# Patient Record
Sex: MALE | Race: BLACK OR AFRICAN AMERICAN | NOT HISPANIC OR LATINO | Employment: STUDENT | URBAN - METROPOLITAN AREA
[De-identification: names, ages, dates, MRNs, and addresses within clinical notes are randomized per-mention and may not be internally consistent; named-entity substitution may affect disease eponyms.]

---
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
## 2024-01-16 NOTE — ADDENDUM NOTE
Encounter addended by: Julio Villeda MD on: 1/16/2024 2:46 PM   Actions taken: Level of Service modified

## 2024-01-28 RX ORDER — ALBUTEROL SULFATE 0.83 MG/ML
5 SOLUTION RESPIRATORY (INHALATION) EVERY 6 HOURS PRN
OUTPATIENT
Start: 2024-01-28

## 2024-01-28 RX ORDER — PENTAMIDINE ISETHIONATE 300 MG/300MG
300 INHALANT RESPIRATORY (INHALATION) ONCE
OUTPATIENT
Start: 2024-01-29

## 2024-01-29 ENCOUNTER — HOSPITAL ENCOUNTER (OUTPATIENT)
Dept: PEDIATRIC HEMATOLOGY/ONCOLOGY | Facility: HOSPITAL | Age: 19
Discharge: HOME | End: 2024-01-29
Payer: COMMERCIAL

## 2024-01-29 VITALS
RESPIRATION RATE: 20 BRPM | WEIGHT: 178.79 LBS | DIASTOLIC BLOOD PRESSURE: 73 MMHG | HEART RATE: 76 BPM | SYSTOLIC BLOOD PRESSURE: 117 MMHG | TEMPERATURE: 97.8 F | BODY MASS INDEX: 25.03 KG/M2 | HEIGHT: 71 IN

## 2024-01-29 DIAGNOSIS — D61.9 APLASTIC ANEMIA (MULTI): Primary | ICD-10-CM

## 2024-01-29 LAB
ALBUMIN SERPL BCP-MCNC: 4.8 G/DL (ref 3.4–5)
ALP SERPL-CCNC: 55 U/L (ref 33–120)
ALT SERPL W P-5'-P-CCNC: 12 U/L (ref 10–52)
ANION GAP SERPL CALC-SCNC: 14 MMOL/L (ref 10–20)
AST SERPL W P-5'-P-CCNC: 19 U/L (ref 9–39)
BASOPHILS # BLD AUTO: 0.02 X10*3/UL (ref 0–0.1)
BASOPHILS NFR BLD AUTO: 0.3 %
BILIRUB DIRECT SERPL-MCNC: 0.2 MG/DL (ref 0–0.3)
BILIRUB SERPL-MCNC: 1.3 MG/DL (ref 0–1.2)
BUN SERPL-MCNC: 18 MG/DL (ref 6–23)
CALCIUM SERPL-MCNC: 10.1 MG/DL (ref 8.6–10.6)
CHLORIDE SERPL-SCNC: 103 MMOL/L (ref 98–107)
CO2 SERPL-SCNC: 26 MMOL/L (ref 21–32)
CREAT SERPL-MCNC: 1.07 MG/DL (ref 0.5–1.3)
CYCLOSPORINE BLD IA-MCNC: 125 NG/ML (ref 80–210)
EGFRCR SERPLBLD CKD-EPI 2021: >90 ML/MIN/1.73M*2
EOSINOPHIL # BLD AUTO: 0.13 X10*3/UL (ref 0–0.7)
EOSINOPHIL NFR BLD AUTO: 2.1 %
ERYTHROCYTE [DISTWIDTH] IN BLOOD BY AUTOMATED COUNT: 12 % (ref 11.5–14.5)
GLUCOSE SERPL-MCNC: 88 MG/DL (ref 74–99)
HCT VFR BLD AUTO: 37.4 % (ref 41–52)
HGB BLD-MCNC: 12.9 G/DL (ref 13.5–17.5)
HGB RETIC QN: 33 PG (ref 28–38)
IMM GRANULOCYTES # BLD AUTO: 0.02 X10*3/UL (ref 0–0.7)
IMM GRANULOCYTES NFR BLD AUTO: 0.3 % (ref 0–0.9)
IMMATURE RETIC FRACTION: 15.5 %
LYMPHOCYTES # BLD AUTO: 2.64 X10*3/UL (ref 1.2–4.8)
LYMPHOCYTES NFR BLD AUTO: 43 %
MCH RBC QN AUTO: 28.7 PG (ref 26–34)
MCHC RBC AUTO-ENTMCNC: 34.5 G/DL (ref 32–36)
MCV RBC AUTO: 83 FL (ref 80–100)
MONOCYTES # BLD AUTO: 0.62 X10*3/UL (ref 0.1–1)
MONOCYTES NFR BLD AUTO: 10.1 %
NEUTROPHILS # BLD AUTO: 2.71 X10*3/UL (ref 1.2–7.7)
NEUTROPHILS NFR BLD AUTO: 44.2 %
NRBC BLD-RTO: 0 /100 WBCS (ref 0–0)
PHOSPHATE SERPL-MCNC: 5.1 MG/DL (ref 2.5–4.9)
PLATELET # BLD AUTO: 247 X10*3/UL (ref 150–450)
POTASSIUM SERPL-SCNC: 4 MMOL/L (ref 3.5–5.3)
PROT SERPL-MCNC: 7.7 G/DL (ref 6.4–8.2)
RBC # BLD AUTO: 4.5 X10*6/UL (ref 4.5–5.9)
RETICS #: 0.1 X10*6/UL (ref 0.02–0.12)
RETICS/RBC NFR AUTO: 2.1 % (ref 0.5–2)
SODIUM SERPL-SCNC: 139 MMOL/L (ref 136–145)
WBC # BLD AUTO: 6.1 X10*3/UL (ref 4.4–11.3)

## 2024-01-29 PROCEDURE — 99215 OFFICE O/P EST HI 40 MIN: CPT | Performed by: STUDENT IN AN ORGANIZED HEALTH CARE EDUCATION/TRAINING PROGRAM

## 2024-01-29 PROCEDURE — 84100 ASSAY OF PHOSPHORUS: CPT | Performed by: STUDENT IN AN ORGANIZED HEALTH CARE EDUCATION/TRAINING PROGRAM

## 2024-01-29 PROCEDURE — 2500000004 HC RX 250 GENERAL PHARMACY W/ HCPCS (ALT 636 FOR OP/ED): Performed by: STUDENT IN AN ORGANIZED HEALTH CARE EDUCATION/TRAINING PROGRAM

## 2024-01-29 PROCEDURE — 94644 CONT INHLJ TX 1ST HOUR: CPT

## 2024-01-29 PROCEDURE — 84075 ASSAY ALKALINE PHOSPHATASE: CPT | Performed by: STUDENT IN AN ORGANIZED HEALTH CARE EDUCATION/TRAINING PROGRAM

## 2024-01-29 PROCEDURE — 80069 RENAL FUNCTION PANEL: CPT | Performed by: STUDENT IN AN ORGANIZED HEALTH CARE EDUCATION/TRAINING PROGRAM

## 2024-01-29 PROCEDURE — 85045 AUTOMATED RETICULOCYTE COUNT: CPT | Performed by: STUDENT IN AN ORGANIZED HEALTH CARE EDUCATION/TRAINING PROGRAM

## 2024-01-29 PROCEDURE — 80158 DRUG ASSAY CYCLOSPORINE: CPT | Performed by: STUDENT IN AN ORGANIZED HEALTH CARE EDUCATION/TRAINING PROGRAM

## 2024-01-29 PROCEDURE — 36415 COLL VENOUS BLD VENIPUNCTURE: CPT | Performed by: STUDENT IN AN ORGANIZED HEALTH CARE EDUCATION/TRAINING PROGRAM

## 2024-01-29 PROCEDURE — 85025 COMPLETE CBC W/AUTO DIFF WBC: CPT | Performed by: STUDENT IN AN ORGANIZED HEALTH CARE EDUCATION/TRAINING PROGRAM

## 2024-01-29 PROCEDURE — 94645 CONT INHLJ TX EACH ADDL HOUR: CPT

## 2024-01-29 PROCEDURE — 2500000002 HC RX 250 W HCPCS SELF ADMINISTERED DRUGS (ALT 637 FOR MEDICARE OP, ALT 636 FOR OP/ED)

## 2024-01-29 RX ORDER — PENTAMIDINE ISETHIONATE 300 MG/300MG
300 INHALANT RESPIRATORY (INHALATION) ONCE
Status: COMPLETED | OUTPATIENT
Start: 2024-01-29 | End: 2024-01-29

## 2024-01-29 RX ORDER — ALBUTEROL SULFATE 0.83 MG/ML
SOLUTION RESPIRATORY (INHALATION)
Status: COMPLETED
Start: 2024-01-29 | End: 2024-01-29

## 2024-01-29 RX ORDER — ALBUTEROL SULFATE 5 MG/ML
5 SOLUTION RESPIRATORY (INHALATION) ONCE
Status: DISPENSED | OUTPATIENT
Start: 2024-01-29

## 2024-01-29 RX ADMIN — PENTAMIDINE ISETHIONATE 300 MG: 300 INHALANT RESPIRATORY (INHALATION) at 11:15

## 2024-01-29 RX ADMIN — ALBUTEROL SULFATE 5 MG: 2.5 SOLUTION RESPIRATORY (INHALATION) at 11:00

## 2024-01-29 ASSESSMENT — ENCOUNTER SYMPTOMS
EYES NEGATIVE: 1
ACTIVITY CHANGE: 0
NUMBNESS: 0
ABDOMINAL PAIN: 0
DIZZINESS: 0
MUSCULOSKELETAL NEGATIVE: 1
UNEXPECTED WEIGHT CHANGE: 0
NAUSEA: 0
VOMITING: 0
SLEEP DISTURBANCE: 0
APPETITE CHANGE: 0
ABDOMINAL DISTENTION: 0
FEVER: 0
CHEST TIGHTNESS: 0
CONSTIPATION: 0
EYE PAIN: 0
OCCASIONAL FEELINGS OF UNSTEADINESS: 0
SHORTNESS OF BREATH: 0
CONSTITUTIONAL NEGATIVE: 1
BLOOD IN STOOL: 0
HEADACHES: 0
ENDOCRINE NEGATIVE: 1
HEMATOLOGIC/LYMPHATIC NEGATIVE: 1
NEUROLOGICAL NEGATIVE: 1
RHINORRHEA: 0
PSYCHIATRIC NEGATIVE: 1
COLOR CHANGE: 0
LOSS OF SENSATION IN FEET: 0
PALPITATIONS: 0
DIARRHEA: 0
CARDIOVASCULAR NEGATIVE: 1
FATIGUE: 0
RECTAL PAIN: 0
ADENOPATHY: 0
RESPIRATORY NEGATIVE: 1

## 2024-01-29 ASSESSMENT — PAIN SCALES - GENERAL: PAINLEVEL: 0-NO PAIN

## 2024-01-29 NOTE — PROGRESS NOTES
Patient ID: Joaquin Talbot is a 18 y.o. male.  Referring Physician: Judith Hill MD  [address]  [facility]-Hematology/Oncology/House Staff  [place] [phone]  Primary Care Provider: Virgilio Torres DO    Date of Service:  1/29/2024    SUBJECTIVE:    History of Present Illness:  Joaquin is a 18 year old male with severe aplastic anemia on immunosuppressive therapy here for follow up. Joaquin is accompanied by his mother today.      Joaquin was last seen in [facility] on 12/18/23. He has overall been well since his last clinic visit. He is currently taking Cyclosporine 300mg daily (200mg AM, 100mg PM). Joaquin reports that he has missed 1-2 doses over the last week. No recent febrile illness. Joaquin reports good energy levels throughout the day. Denies any chest pain, palpitations, shortness of breath at rest or on exertion, dizziness, syncopal episodes or headaches. Denies any easy bruising or bleeding including hematuria, hematochezia, melena, gum bleeding or recent epistaxis. Denies any oral sores, throat pain or rectal pain. Joaquin has no new concerns today.        Past Medical History: Joaquin has a past medical history of Body mass index (BMI) pediatric, 5th percentile to less than 85th percentile for age (04/30/2022), Encounter for examination for participation in sport (08/02/2021), Encounter for routine child health examination without abnormal findings (08/31/2020), Essential (primary) hypertension, Hemorrhage due to vascular prosthetic devices, implants and grafts, initial encounter (CMS/[facility]) (03/08/2022), Other conditions influencing health status (12/22/2021), Other disorders affecting eyelid function (02/03/2017), Personal history of other diseases of the respiratory system (12/23/2021), Personal history of other diseases of the respiratory system (12/22/2021), Personal history of other drug therapy (01/28/2022), Personal history of other specified conditions (12/22/2021),  "Strain of adductor muscle, fascia and tendon of unspecified thigh, initial encounter (09/15/2021), and Unspecified injury of abdomen, initial encounter (02/03/2017).    Surgical History:  Joaquin has a past surgical history that includes Other surgical history (08/04/2022) and Other surgical history (08/04/2022).    Social History:  Joaquin     Family History   Problem Relation Name Age of Onset    Diabetes Father         Review of Systems   Constitutional: Negative.  Negative for activity change, appetite change, fatigue, fever and unexpected weight change.   HENT: Negative.  Negative for congestion and rhinorrhea.    Eyes: Negative.  Negative for pain.   Respiratory: Negative.  Negative for chest tightness and shortness of breath.    Cardiovascular: Negative.  Negative for chest pain, palpitations and leg swelling.   Gastrointestinal:  Negative for abdominal distention, abdominal pain, blood in stool, constipation, diarrhea, nausea, rectal pain and vomiting.   Endocrine: Negative.    Genitourinary: Negative.    Musculoskeletal: Negative.    Skin: Negative.  Negative for color change and rash.   Allergic/Immunologic: Positive for immunocompromised state.   Neurological: Negative.  Negative for dizziness, numbness and headaches.   Hematological: Negative.  Negative for adenopathy.   Psychiatric/Behavioral: Negative.  Negative for behavioral problems and sleep disturbance.    All other systems reviewed and are negative.        VS:  /73 (BP Location: Right arm, Patient Position: Sitting, BP Cuff Size: Adult)   Pulse 76   Temp 36.6 °C (97.8 °F) (Oral)   Resp 20   Ht 1.811 m (5' 11.3\")   Wt 81.1 kg (178 lb 12.7 oz)   BMI 24.73 kg/m²   BSA: 2.02 meters squared    Physical Exam  Vitals and nursing note reviewed.   Constitutional:       General: He is not in acute distress.     Appearance: Normal appearance. He is normal weight. He is not ill-appearing or toxic-appearing.   HENT:      Head: Normocephalic " and atraumatic.      Nose: Nose normal.      Mouth/Throat:      Mouth: Mucous membranes are moist.      Pharynx: No oropharyngeal exudate or posterior oropharyngeal erythema.   Eyes:      Extraocular Movements: Extraocular movements intact.      Conjunctiva/sclera: Conjunctivae normal.      Pupils: Pupils are equal, round, and reactive to light.   Cardiovascular:      Rate and Rhythm: Normal rate and regular rhythm.      Pulses: Normal pulses.      Heart sounds: Normal heart sounds. No murmur heard.  Pulmonary:      Effort: Pulmonary effort is normal.      Breath sounds: Normal breath sounds.   Abdominal:      General: Bowel sounds are normal. There is no distension.      Palpations: Abdomen is soft. There is no mass.      Tenderness: There is no abdominal tenderness. There is no guarding.   Musculoskeletal:         General: Normal range of motion.      Cervical back: Normal range of motion.   Skin:     General: Skin is warm.      Capillary Refill: Capillary refill takes less than 2 seconds.      Coloration: Skin is not pale.      Findings: No bruising or rash.   Neurological:      General: No focal deficit present.      Mental Status: He is alert and oriented to person, place, and time. Mental status is at baseline.   Psychiatric:         Mood and Affect: Mood normal.         Laboratory:   Latest Reference Range & Units 12/18/23 09:09   WBC 4.4 - 11.3 x10*3/uL 7.7   nRBC 0.0 - 0.0 /100 WBCs 0.0   RBC 4.50 - 5.90 x10*6/uL 3.93 (L)   HEMOGLOBIN 13.5 - 17.5 g/dL 11.2 (L)   HEMATOCRIT 41.0 - 52.0 % 34.2 (L)   MCV 80 - 100 fL 87   MCH 26.0 - 34.0 pg 28.5   MCHC 32.0 - 36.0 g/dL 32.7   RED CELL DISTRIBUTION WIDTH 11.5 - 14.5 % 11.7   Platelets 150 - 450 x10*3/uL 207      Latest Reference Range & Units 12/18/23 09:09   Neutrophils Absolute 1.20 - 7.70 x10*3/uL 3.98      Latest Reference Range & Units 12/18/23 09:09   Creatinine 0.50 - 1.30 mg/dL 1.11      Latest Reference Range & Units 12/18/23 09:09   Cyclosporine 80 -  210 ng/mL 218 (H)       ASSESSMENT and PLAN:    Assessment:  Joaquin is a 18 year old male with severe aplastic anemia here for follow up. He is s/p ATG (2/23-2/26/2022) and is currently on CSA and Eltrombopag. His course was complicated by steroid induced hyperglycemia/ hypertension (resolved), CSA induced gingival hyperplasia (responded to Azithomycin and gum reduction procedure by periodontist) which is also resolved now. He has responded to immunosuppressive therapy with count recovery which has since been stable. His last PRBC transfusion was on 4/1/22 and platelets were on 4/25/22 (given prior to dental procedure). He is currently on cyclosporine 300mg daily (200mg AM and 100mg PM). CBC today is stable with Hb 12.9, WBC 6.1 with normal ANC 2710, and normal platelet count 247K. Cyclosporine level is sub optimal 125 (goal of 200-400), however may be reflective of decreased adherence.      Plan:  Aplastic Anemia:  - Continue Cyclosporine 300 mg daily (200 mg AM and 100 mg PM) and will recheck Cyclosporine level at his next visit in 4 weeks. Advised to hold that morning's dose before level is drawn.   - Will begin a slow cyclosporine wean at his next visit  - Continue Promacta 150mg daily  - PJP prophylaxis: Received Pentamidine today. Next dose due on 2/26/24  - Fungal prophylaxis: Continue Fluconazole 400mg daily     Plan discussed with patient who voiced understanding and all questions were answered  Patient was seen and discussed with Pediatric Hematologist/Oncologist, Dr. Kamran Hill MD

## 2024-02-02 NOTE — ADDENDUM NOTE
Encounter addended by: Judith Hill MD on: 2/2/2024 12:58 PM   Actions taken: Order list changed, Diagnosis association updated, Clinical Note Signed

## 2024-02-21 NOTE — ADDENDUM NOTE
Encounter addended by: Julio Villeda MD on: 2/21/2024 2:59 PM   Actions taken: Level of Service modified

## 2024-02-23 DIAGNOSIS — D61.9 APLASTIC ANEMIA (MULTI): Primary | ICD-10-CM

## 2024-02-23 RX ORDER — CYCLOSPORINE 100 MG/1
300 CAPSULE, GELATIN COATED ORAL DAILY
Qty: 90 CAPSULE | Refills: 2 | Status: SHIPPED | OUTPATIENT
Start: 2024-02-23 | End: 2024-05-23

## 2024-02-26 ENCOUNTER — HOSPITAL ENCOUNTER (OUTPATIENT)
Dept: PEDIATRIC HEMATOLOGY/ONCOLOGY | Facility: HOSPITAL | Age: 19
Discharge: HOME | End: 2024-02-26
Payer: COMMERCIAL

## 2024-02-26 VITALS
BODY MASS INDEX: 26.08 KG/M2 | SYSTOLIC BLOOD PRESSURE: 116 MMHG | RESPIRATION RATE: 18 BRPM | WEIGHT: 186.29 LBS | HEIGHT: 71 IN | DIASTOLIC BLOOD PRESSURE: 73 MMHG

## 2024-02-26 DIAGNOSIS — D61.9 APLASTIC ANEMIA (MULTI): Primary | ICD-10-CM

## 2024-02-26 LAB
ALBUMIN SERPL BCP-MCNC: 4.5 G/DL (ref 3.4–5)
ALP SERPL-CCNC: 60 U/L (ref 33–120)
ALT SERPL W P-5'-P-CCNC: 16 U/L (ref 10–52)
ANION GAP SERPL CALC-SCNC: 11 MMOL/L (ref 10–20)
AST SERPL W P-5'-P-CCNC: 19 U/L (ref 9–39)
BASOPHILS # BLD AUTO: 0.01 X10*3/UL (ref 0–0.1)
BASOPHILS NFR BLD AUTO: 0.2 %
BILIRUB DIRECT SERPL-MCNC: 0.2 MG/DL (ref 0–0.3)
BILIRUB SERPL-MCNC: 1.6 MG/DL (ref 0–1.2)
BUN SERPL-MCNC: 15 MG/DL (ref 6–23)
CALCIUM SERPL-MCNC: 10 MG/DL (ref 8.6–10.6)
CHLORIDE SERPL-SCNC: 105 MMOL/L (ref 98–107)
CO2 SERPL-SCNC: 27 MMOL/L (ref 21–32)
CREAT SERPL-MCNC: 0.95 MG/DL (ref 0.5–1.3)
CYCLOSPORINE BLD IA-MCNC: 136 NG/ML (ref 80–210)
EGFRCR SERPLBLD CKD-EPI 2021: >90 ML/MIN/1.73M*2
EOSINOPHIL # BLD AUTO: 0.08 X10*3/UL (ref 0–0.7)
EOSINOPHIL NFR BLD AUTO: 1.7 %
ERYTHROCYTE [DISTWIDTH] IN BLOOD BY AUTOMATED COUNT: 12.2 % (ref 11.5–14.5)
GLUCOSE SERPL-MCNC: 94 MG/DL (ref 74–99)
HCT VFR BLD AUTO: 38.5 % (ref 41–52)
HGB BLD-MCNC: 12.6 G/DL (ref 13.5–17.5)
HGB RETIC QN: 32 PG (ref 28–38)
IMM GRANULOCYTES # BLD AUTO: 0.01 X10*3/UL (ref 0–0.7)
IMM GRANULOCYTES NFR BLD AUTO: 0.2 % (ref 0–0.9)
IMMATURE RETIC FRACTION: 17.5 %
LYMPHOCYTES # BLD AUTO: 1.66 X10*3/UL (ref 1.2–4.8)
LYMPHOCYTES NFR BLD AUTO: 36.2 %
MCH RBC QN AUTO: 28 PG (ref 26–34)
MCHC RBC AUTO-ENTMCNC: 32.7 G/DL (ref 32–36)
MCV RBC AUTO: 86 FL (ref 80–100)
MONOCYTES # BLD AUTO: 0.41 X10*3/UL (ref 0.1–1)
MONOCYTES NFR BLD AUTO: 9 %
NEUTROPHILS # BLD AUTO: 2.41 X10*3/UL (ref 1.2–7.7)
NEUTROPHILS NFR BLD AUTO: 52.7 %
NRBC BLD-RTO: 0 /100 WBCS (ref 0–0)
PHOSPHATE SERPL-MCNC: 3.4 MG/DL (ref 2.5–4.9)
PLATELET # BLD AUTO: 197 X10*3/UL (ref 150–450)
POTASSIUM SERPL-SCNC: 4.4 MMOL/L (ref 3.5–5.3)
PROT SERPL-MCNC: 7.4 G/DL (ref 6.4–8.2)
RBC # BLD AUTO: 4.5 X10*6/UL (ref 4.5–5.9)
RETICS #: 0.09 X10*6/UL (ref 0.02–0.12)
RETICS/RBC NFR AUTO: 2 % (ref 0.5–2)
SODIUM SERPL-SCNC: 139 MMOL/L (ref 136–145)
WBC # BLD AUTO: 4.6 X10*3/UL (ref 4.4–11.3)

## 2024-02-26 PROCEDURE — 85045 AUTOMATED RETICULOCYTE COUNT: CPT | Performed by: STUDENT IN AN ORGANIZED HEALTH CARE EDUCATION/TRAINING PROGRAM

## 2024-02-26 PROCEDURE — 82248 BILIRUBIN DIRECT: CPT | Performed by: STUDENT IN AN ORGANIZED HEALTH CARE EDUCATION/TRAINING PROGRAM

## 2024-02-26 PROCEDURE — 2500000004 HC RX 250 GENERAL PHARMACY W/ HCPCS (ALT 636 FOR OP/ED): Performed by: STUDENT IN AN ORGANIZED HEALTH CARE EDUCATION/TRAINING PROGRAM

## 2024-02-26 PROCEDURE — 85025 COMPLETE CBC W/AUTO DIFF WBC: CPT | Performed by: STUDENT IN AN ORGANIZED HEALTH CARE EDUCATION/TRAINING PROGRAM

## 2024-02-26 PROCEDURE — 99215 OFFICE O/P EST HI 40 MIN: CPT | Performed by: STUDENT IN AN ORGANIZED HEALTH CARE EDUCATION/TRAINING PROGRAM

## 2024-02-26 PROCEDURE — 2500000002 HC RX 250 W HCPCS SELF ADMINISTERED DRUGS (ALT 637 FOR MEDICARE OP, ALT 636 FOR OP/ED): Performed by: STUDENT IN AN ORGANIZED HEALTH CARE EDUCATION/TRAINING PROGRAM

## 2024-02-26 PROCEDURE — 94642 AEROSOL INHALATION TREATMENT: CPT | Performed by: STUDENT IN AN ORGANIZED HEALTH CARE EDUCATION/TRAINING PROGRAM

## 2024-02-26 PROCEDURE — 88187 FLOWCYTOMETRY/READ 2-8: CPT | Performed by: STUDENT IN AN ORGANIZED HEALTH CARE EDUCATION/TRAINING PROGRAM

## 2024-02-26 PROCEDURE — 88185 FLOWCYTOMETRY/TC ADD-ON: CPT | Mod: TC | Performed by: STUDENT IN AN ORGANIZED HEALTH CARE EDUCATION/TRAINING PROGRAM

## 2024-02-26 PROCEDURE — 84100 ASSAY OF PHOSPHORUS: CPT | Performed by: STUDENT IN AN ORGANIZED HEALTH CARE EDUCATION/TRAINING PROGRAM

## 2024-02-26 PROCEDURE — 80158 DRUG ASSAY CYCLOSPORINE: CPT | Performed by: STUDENT IN AN ORGANIZED HEALTH CARE EDUCATION/TRAINING PROGRAM

## 2024-02-26 PROCEDURE — 36415 COLL VENOUS BLD VENIPUNCTURE: CPT | Performed by: STUDENT IN AN ORGANIZED HEALTH CARE EDUCATION/TRAINING PROGRAM

## 2024-02-26 PROCEDURE — 80048 BASIC METABOLIC PNL TOTAL CA: CPT | Performed by: STUDENT IN AN ORGANIZED HEALTH CARE EDUCATION/TRAINING PROGRAM

## 2024-02-26 RX ORDER — ALBUTEROL SULFATE 0.83 MG/ML
5 SOLUTION RESPIRATORY (INHALATION) ONCE
Status: COMPLETED | OUTPATIENT
Start: 2024-02-26 | End: 2024-02-26

## 2024-02-26 RX ORDER — PENTAMIDINE ISETHIONATE 300 MG/300MG
300 INHALANT RESPIRATORY (INHALATION) ONCE
Status: COMPLETED | OUTPATIENT
Start: 2024-02-26 | End: 2024-02-26

## 2024-02-26 RX ADMIN — PENTAMIDINE ISETHIONATE 300 MG: 300 INHALANT RESPIRATORY (INHALATION) at 11:08

## 2024-02-26 RX ADMIN — ALBUTEROL SULFATE 5 MG: 2.5 SOLUTION RESPIRATORY (INHALATION) at 10:58

## 2024-02-26 ASSESSMENT — ENCOUNTER SYMPTOMS
PSYCHIATRIC NEGATIVE: 1
DEPRESSION: 0
PALPITATIONS: 0
COLOR CHANGE: 0
HEADACHES: 0
ABDOMINAL PAIN: 0
SLEEP DISTURBANCE: 0
ENDOCRINE NEGATIVE: 1
OCCASIONAL FEELINGS OF UNSTEADINESS: 0
UNEXPECTED WEIGHT CHANGE: 0
EYE PAIN: 0
CONSTIPATION: 0
ADENOPATHY: 0
RESPIRATORY NEGATIVE: 1
DIARRHEA: 0
DIZZINESS: 0
ACTIVITY CHANGE: 0
FATIGUE: 0
NEUROLOGICAL NEGATIVE: 1
LOSS OF SENSATION IN FEET: 0
VOMITING: 0
NUMBNESS: 0
RHINORRHEA: 0
BLOOD IN STOOL: 0
APPETITE CHANGE: 0
CARDIOVASCULAR NEGATIVE: 1
MUSCULOSKELETAL NEGATIVE: 1
ABDOMINAL DISTENTION: 0
RECTAL PAIN: 0
CONSTITUTIONAL NEGATIVE: 1
CHEST TIGHTNESS: 0
SHORTNESS OF BREATH: 0
FEVER: 0
EYES NEGATIVE: 1
NAUSEA: 0
HEMATOLOGIC/LYMPHATIC NEGATIVE: 1

## 2024-02-26 ASSESSMENT — COLUMBIA-SUICIDE SEVERITY RATING SCALE - C-SSRS
6. HAVE YOU EVER DONE ANYTHING, STARTED TO DO ANYTHING, OR PREPARED TO DO ANYTHING TO END YOUR LIFE?: NO
1. IN THE PAST MONTH, HAVE YOU WISHED YOU WERE DEAD OR WISHED YOU COULD GO TO SLEEP AND NOT WAKE UP?: NO
2. HAVE YOU ACTUALLY HAD ANY THOUGHTS OF KILLING YOURSELF?: NO

## 2024-02-26 ASSESSMENT — PAIN SCALES - GENERAL: PAINLEVEL: 0-NO PAIN

## 2024-02-26 NOTE — ADDENDUM NOTE
Encounter addended by: Judith Payan RN on: 2/26/2024 4:45 PM   Actions taken: Charge Capture section accepted

## 2024-02-26 NOTE — PROGRESS NOTES
Patient ID: Joaquin Talbot is a 19 y.o. male.  Referring Physician: Julio Villeda MD  [id number]  Pediatrics-Hematology and Oncology  [place] [phone]  Primary Care Provider: Virgilio Torres DO    Date of Service:  2/26/2024    SUBJECTIVE:    History of Present Illness:  Joaquin is a 19 year old male with severe aplastic anemia on immunosuppressive therapy here for follow up. Joaquin is accompanied by his mother today.      Joaquin was last seen in [facility] on 1/29/24. He has overall been well since his last clinic visit. He is currently taking Cyclosporine 300mg daily (200mg AM, 100mg PM). Joaquin reports that he has missed 1 dose on Friday, but has otherwise been adherent to his medications. No recent febrile illness. Joaquin reports good energy levels throughout the day. Denies any chest pain, palpitations, shortness of breath at rest or on exertion, dizziness, syncopal episodes or headaches. Denies any easy bruising or bleeding including hematuria, hematochezia, melena, gum bleeding or recent epistaxis. Denies any oral sores, throat pain or rectal pain. Joaquin has no new concerns today.        Past Medical History: Joaquin has a past medical history of Body mass index (BMI) pediatric, 5th percentile to less than 85th percentile for age (04/30/2022), Encounter for examination for participation in sport (08/02/2021), Encounter for routine child health examination without abnormal findings (08/31/2020), Essential (primary) hypertension, Hemorrhage due to vascular prosthetic devices, implants and grafts, initial encounter (CMS/[facility]) (03/08/2022), Other conditions influencing health status (12/22/2021), Other disorders affecting eyelid function (02/03/2017), Personal history of other diseases of the respiratory system (12/23/2021), Personal history of other diseases of the respiratory system (12/22/2021), Personal history of other drug therapy (01/28/2022), Personal history  "of other specified conditions (12/22/2021), Strain of adductor muscle, fascia and tendon of unspecified thigh, initial encounter (09/15/2021), and Unspecified injury of abdomen, initial encounter (02/03/2017).    Surgical History:  Joaquin has a past surgical history that includes Other surgical history (08/04/2022) and Other surgical history (08/04/2022).    Social History:  Joaquin     Family History   Problem Relation Name Age of Onset    Diabetes Father         Review of Systems   Constitutional: Negative.  Negative for activity change, appetite change, fatigue, fever and unexpected weight change.   HENT: Negative.  Negative for congestion and rhinorrhea.    Eyes: Negative.  Negative for pain.   Respiratory: Negative.  Negative for chest tightness and shortness of breath.    Cardiovascular: Negative.  Negative for chest pain, palpitations and leg swelling.   Gastrointestinal:  Negative for abdominal distention, abdominal pain, blood in stool, constipation, diarrhea, nausea, rectal pain and vomiting.   Endocrine: Negative.    Genitourinary: Negative.    Musculoskeletal: Negative.    Skin: Negative.  Negative for color change and rash.   Allergic/Immunologic: Positive for immunocompromised state.   Neurological: Negative.  Negative for dizziness, numbness and headaches.   Hematological: Negative.  Negative for adenopathy.   Psychiatric/Behavioral: Negative.  Negative for behavioral problems and sleep disturbance.    All other systems reviewed and are negative.        VS:  /73 (BP Location: Right arm, Patient Position: Sitting, BP Cuff Size: Child)   Resp 18   Ht 1.811 m (5' 11.3\")   Wt 84.5 kg (186 lb 4.6 oz)   BMI 25.76 kg/m²   BSA: 2.06 meters squared    Physical Exam  Vitals and nursing note reviewed.   Constitutional:       General: He is not in acute distress.     Appearance: Normal appearance. He is normal weight. He is not ill-appearing or toxic-appearing.   HENT:      Head: Normocephalic and " atraumatic.      Nose: Nose normal.      Mouth/Throat:      Mouth: Mucous membranes are moist.      Pharynx: No oropharyngeal exudate or posterior oropharyngeal erythema.      Comments: Small oral sore at right molar region with surrounding erythema  Eyes:      General: No scleral icterus.     Extraocular Movements: Extraocular movements intact.      Conjunctiva/sclera: Conjunctivae normal.      Pupils: Pupils are equal, round, and reactive to light.   Cardiovascular:      Rate and Rhythm: Normal rate and regular rhythm.      Pulses: Normal pulses.      Heart sounds: Normal heart sounds. No murmur heard.  Pulmonary:      Effort: Pulmonary effort is normal.      Breath sounds: Normal breath sounds.   Abdominal:      General: Bowel sounds are normal. There is no distension.      Palpations: Abdomen is soft. There is no mass.      Tenderness: There is no abdominal tenderness. There is no guarding.   Musculoskeletal:         General: Normal range of motion.      Cervical back: Normal range of motion.   Skin:     General: Skin is warm.      Capillary Refill: Capillary refill takes less than 2 seconds.      Coloration: Skin is not pale.      Findings: No bruising or rash.   Neurological:      General: No focal deficit present.      Mental Status: He is alert and oriented to person, place, and time. Mental status is at baseline.   Psychiatric:         Mood and Affect: Mood normal.         Laboratory:   Latest Reference Range & Units 12/18/23 09:09   WBC 4.4 - 11.3 x10*3/uL 7.7   nRBC 0.0 - 0.0 /100 WBCs 0.0   RBC 4.50 - 5.90 x10*6/uL 3.93 (L)   HEMOGLOBIN 13.5 - 17.5 g/dL 11.2 (L)   HEMATOCRIT 41.0 - 52.0 % 34.2 (L)   MCV 80 - 100 fL 87   MCH 26.0 - 34.0 pg 28.5   MCHC 32.0 - 36.0 g/dL 32.7   RED CELL DISTRIBUTION WIDTH 11.5 - 14.5 % 11.7   Platelets 150 - 450 x10*3/uL 207      Latest Reference Range & Units 12/18/23 09:09   Neutrophils Absolute 1.20 - 7.70 x10*3/uL 3.98      Latest Reference Range & Units 12/18/23 09:09    Creatinine 0.50 - 1.30 mg/dL 1.11      Latest Reference Range & Units 12/18/23 09:09   Cyclosporine 80 - 210 ng/mL 218 (H)       ASSESSMENT and PLAN:    Assessment:  Joaquin is a 19 year old male with severe aplastic anemia here for follow up. He is s/p ATG (2/23-2/26/2022) and is currently on CSA and Eltrombopag. His course was complicated by steroid induced hyperglycemia/hypertension (resolved), CSA induced gingival hyperplasia (responded to Azithomycin and gum reduction procedure by periodontist) which is also resolved now. He has responded to immunosuppressive therapy with count recovery which has since been stable. His last PRBC transfusion was on 4/1/22 and platelets were on 4/25/22 (given prior to dental procedure). He is currently on cyclosporine 300mg daily (200mg AM and 100mg PM). CBC today is stable with Hb 12.6, WBC 4.6 with normal ANC 2410, and normal platelet count 197K. Cyclosporine level is sub optimal 136 (goal 200-400), likely secondary to sub optimal adherence. PNH profile was also sent given mild unconjugated hyperbilirubinemia and mild reticulocytosis at last visit.     Plan:  Aplastic Anemia:  - Continue Cyclosporine 300 mg daily (200 mg AM and 100 mg PM) and will recheck Cyclosporine level at his next visit in 4 weeks. Advised to hold that morning's dose before level is drawn.   - Continue Promacta 150mg daily  - PJP prophylaxis: Received Pentamidine today. Will continue q4 weeks  - Fungal prophylaxis: Continue Fluconazole 400mg daily  - Follow PNH profile     Plan discussed with patient who voiced understanding and all questions were answered  Patient was seen and discussed with Pediatric Hematologist/Oncologist, Dr. Kamran Hill MD

## 2024-02-26 NOTE — LETTER
February 26, 2024     Patient: Joaquin Talbot   YOB: 2005   Date of Visit: 2/26/2024       To Whom It May Concern:    Joaquin Talbot was seen in my clinic on 2/26/2024 at 9:30 am. He was here until 1200 PM. Please excuse Joaquin for his absence from school on this day to make the appointment.    If you have any questions or concerns, please don't hesitate to call.         Sincerely,         Julio Villeda MD        CC: No Recipients

## 2024-03-01 LAB
FLOW CYTOMETRY SPECIALIST REVIEW: ABNORMAL
PATH REVIEW, PNH: ABNORMAL
PNH RESULTS: POSITIVE
RBC PNH CLONE TOTAL %: 0.7 %
WBC MONOCYTE PNH CLONE %: 0.8 %
WBC NEUTROPHIL PNH CLONE %: 0.8 %

## 2024-03-22 RX ORDER — ALBUTEROL SULFATE 0.83 MG/ML
2.5 SOLUTION RESPIRATORY (INHALATION) ONCE AS NEEDED
Status: CANCELLED | OUTPATIENT
Start: 2024-03-25

## 2024-03-22 RX ORDER — EPINEPHRINE 1 MG/ML
0.01 INJECTION, SOLUTION, CONCENTRATE INTRAVENOUS ONCE AS NEEDED
Status: CANCELLED | OUTPATIENT
Start: 2024-03-25

## 2024-03-22 RX ORDER — DIPHENHYDRAMINE HYDROCHLORIDE 50 MG/ML
50 INJECTION INTRAMUSCULAR; INTRAVENOUS ONCE AS NEEDED
Status: CANCELLED | OUTPATIENT
Start: 2024-03-25

## 2024-03-24 ENCOUNTER — HOSPITAL ENCOUNTER (EMERGENCY)
Facility: HOSPITAL | Age: 19
Discharge: HOME | End: 2024-03-24
Attending: PEDIATRICS
Payer: COMMERCIAL

## 2024-03-24 VITALS
WEIGHT: 187.39 LBS | HEIGHT: 72 IN | HEART RATE: 102 BPM | DIASTOLIC BLOOD PRESSURE: 61 MMHG | BODY MASS INDEX: 25.38 KG/M2 | RESPIRATION RATE: 18 BRPM | TEMPERATURE: 98.3 F | OXYGEN SATURATION: 99 % | SYSTOLIC BLOOD PRESSURE: 121 MMHG

## 2024-03-24 DIAGNOSIS — J11.1 INFLUENZA-LIKE ILLNESS: Primary | ICD-10-CM

## 2024-03-24 LAB
BASOPHILS # BLD AUTO: 0.01 X10*3/UL (ref 0–0.1)
BASOPHILS NFR BLD AUTO: 0.1 %
EOSINOPHIL # BLD AUTO: 0 X10*3/UL (ref 0–0.7)
EOSINOPHIL NFR BLD AUTO: 0 %
ERYTHROCYTE [DISTWIDTH] IN BLOOD BY AUTOMATED COUNT: 12.3 % (ref 11.5–14.5)
FLUAV RNA RESP QL NAA+PROBE: DETECTED
FLUBV RNA RESP QL NAA+PROBE: NOT DETECTED
HCT VFR BLD AUTO: 39.5 % (ref 41–52)
HGB BLD-MCNC: 13.3 G/DL (ref 13.5–17.5)
IMM GRANULOCYTES # BLD AUTO: 0.01 X10*3/UL (ref 0–0.7)
IMM GRANULOCYTES NFR BLD AUTO: 0.1 % (ref 0–0.9)
LYMPHOCYTES # BLD AUTO: 1.1 X10*3/UL (ref 1.2–4.8)
LYMPHOCYTES NFR BLD AUTO: 15.2 %
MCH RBC QN AUTO: 28.7 PG (ref 26–34)
MCHC RBC AUTO-ENTMCNC: 33.7 G/DL (ref 32–36)
MCV RBC AUTO: 85 FL (ref 80–100)
MONOCYTES # BLD AUTO: 1.4 X10*3/UL (ref 0.1–1)
MONOCYTES NFR BLD AUTO: 19.3 %
NEUTROPHILS # BLD AUTO: 4.74 X10*3/UL (ref 1.2–7.7)
NEUTROPHILS NFR BLD AUTO: 65.3 %
NRBC BLD-RTO: 0 /100 WBCS (ref 0–0)
PLATELET # BLD AUTO: 162 X10*3/UL (ref 150–450)
POC RAPID STREP: NEGATIVE
RBC # BLD AUTO: 4.64 X10*6/UL (ref 4.5–5.9)
SARS-COV-2 RNA RESP QL NAA+PROBE: NOT DETECTED
WBC # BLD AUTO: 7.3 X10*3/UL (ref 4.4–11.3)

## 2024-03-24 PROCEDURE — 36415 COLL VENOUS BLD VENIPUNCTURE: CPT | Performed by: STUDENT IN AN ORGANIZED HEALTH CARE EDUCATION/TRAINING PROGRAM

## 2024-03-24 PROCEDURE — 2500000004 HC RX 250 GENERAL PHARMACY W/ HCPCS (ALT 636 FOR OP/ED): Performed by: STUDENT IN AN ORGANIZED HEALTH CARE EDUCATION/TRAINING PROGRAM

## 2024-03-24 PROCEDURE — 85025 COMPLETE CBC W/AUTO DIFF WBC: CPT | Performed by: STUDENT IN AN ORGANIZED HEALTH CARE EDUCATION/TRAINING PROGRAM

## 2024-03-24 PROCEDURE — 87075 CULTR BACTERIA EXCEPT BLOOD: CPT | Mod: 59 | Performed by: STUDENT IN AN ORGANIZED HEALTH CARE EDUCATION/TRAINING PROGRAM

## 2024-03-24 PROCEDURE — 87636 SARSCOV2 & INF A&B AMP PRB: CPT | Performed by: STUDENT IN AN ORGANIZED HEALTH CARE EDUCATION/TRAINING PROGRAM

## 2024-03-24 PROCEDURE — 2500000001 HC RX 250 WO HCPCS SELF ADMINISTERED DRUGS (ALT 637 FOR MEDICARE OP): Performed by: STUDENT IN AN ORGANIZED HEALTH CARE EDUCATION/TRAINING PROGRAM

## 2024-03-24 PROCEDURE — 99284 EMERGENCY DEPT VISIT MOD MDM: CPT | Performed by: PEDIATRICS

## 2024-03-24 PROCEDURE — 87880 STREP A ASSAY W/OPTIC: CPT | Performed by: STUDENT IN AN ORGANIZED HEALTH CARE EDUCATION/TRAINING PROGRAM

## 2024-03-24 PROCEDURE — 96365 THER/PROPH/DIAG IV INF INIT: CPT | Performed by: PEDIATRICS

## 2024-03-24 PROCEDURE — 99284 EMERGENCY DEPT VISIT MOD MDM: CPT | Mod: 25 | Performed by: PEDIATRICS

## 2024-03-24 RX ORDER — IBUPROFEN 600 MG/1
600 TABLET, FILM COATED ORAL ONCE
Status: COMPLETED | OUTPATIENT
Start: 2024-03-24 | End: 2024-03-24

## 2024-03-24 RX ORDER — OSELTAMIVIR PHOSPHATE 75 MG/1
75 CAPSULE ORAL EVERY 12 HOURS
Qty: 10 CAPSULE | Refills: 0 | Status: SHIPPED | OUTPATIENT
Start: 2024-03-24 | End: 2024-03-29

## 2024-03-24 RX ORDER — CEFTRIAXONE 2 G/50ML
2 INJECTION, SOLUTION INTRAVENOUS ONCE
Status: COMPLETED | OUTPATIENT
Start: 2024-03-24 | End: 2024-03-24

## 2024-03-24 RX ADMIN — CEFTRIAXONE 2 G: 2 INJECTION, SOLUTION INTRAVENOUS at 11:41

## 2024-03-24 RX ADMIN — IBUPROFEN 600 MG: 600 TABLET, FILM COATED ORAL at 11:36

## 2024-03-24 ASSESSMENT — PAIN - FUNCTIONAL ASSESSMENT: PAIN_FUNCTIONAL_ASSESSMENT: 0-10

## 2024-03-24 ASSESSMENT — PAIN SCALES - GENERAL
PAINLEVEL_OUTOF10: 2
PAINLEVEL_OUTOF10: 0 - NO PAIN

## 2024-03-24 NOTE — DISCHARGE INSTRUCTIONS
Please follow-up for your scheduled appointment tomorrow on 3/25, the hematology team would like to see you at that time.  Please continue with your home scheduled medications.  You can take Tylenol and Motrin as needed for body aches or fever.  If you do have a fever after 24 hours of your 1 today, please call the hematology clinic and return to the emergency department for evaluation.  We did test you for flu and COVID, we will follow-up with you if those results are positive.

## 2024-03-24 NOTE — ED PROVIDER NOTES
HPI: 19-year-old with a history of severe aplastic anemia immunosuppressive therapy presenting with 3-day history of sick symptoms.  Patient is complaining of headache, fever with Tmax of 102 this morning, chills, sore throat, cough, congestion and body aches.  No sick contacts at home, patient does attend college.  Patient has had a decreased appetite but still staying well-hydrated.  Had 1 episode of emesis, denies any diarrhea.  Denies any new rashes, lymphadenopathy or bleeding.  Patient has been taking DayQuil for symptom management.  Has not taken any Tylenol or Motrin for fevers.  Patient states he has been compliant with his home medications.  Due to fever and sick symptoms called pediatric hematology oncology clinic and they recommended for him to be evaluated in the ED for labs and antibiotics.    Patient recently seen in pediatric hematology oncology clinic on 2/26/2024 and was recommended to continue with cyclosporine 300 mg daily, Promacta 150 mg daily, and fluconazole 400 mg daily and received pentamadine, in which he gets every 4 weeks.    Most recent labs 2/26:  CBC: 4.6/12.6/38.5/197, ANC 2410  Reticulocyte 2.0%  CMP: 139/4.4, 105/27, 15/0.95, Ca 10, Phos 3.4, LFTs within normal limits T. bili 1.6*  Cyclosporine level 136 (goal 200-400), concern for suboptimal adherence  PNH profile: Positive, WBC neutrophil PNH clone 0.8, WBC monocyte PNH clone 0.8, RBC PNH clone total percent 0.7%    Past Medical History: Aplastic anemia  Past Surgical History: Dental procedures     Medications: Cyclosporine, fluconazole, Promacta, pentamadine every 4 weeks  Allergies: Amoxicillin, pentamadine  Immunizations: Not up-to-date due to immunodeficient status.   Family History: denies family history pertinent to presenting problem  ROS: All systems were reviewed and negative except as mentioned above in HPI  /School: sophomore in college, studying engineering  Lives in the dorms    Physical Exam:  Vital signs  reviewed and documented below.  /86 (BP Location: Right arm, Patient Position: Sitting)   Pulse 98   Temp 37.1 °C (98.7 °F) (Oral)   Resp 24   Ht 1.829 m (6')   Wt 85 kg (187 lb 6.3 oz)   SpO2 97%   BMI 25.41 kg/m²       Gen: Alert, tired appearing, in NAD  Head/Neck: normocephalic, atraumatic, neck w/ FROM, no lymphadenopathy  Eyes: EOMI, PERRL, anicteric sclerae, noninjected conjunctivae  Ears: TMs clear b/l without sign of infection  Nose: Notable congestion  Mouth:  MMM, erythematous oropharynx without exudates  Heart: RRR, no murmurs, rubs, or gallops  Lungs: No increased work of breathing, lungs clear bilaterally, no wheezing, crackles, rhonchi  Abdomen: soft, NT, ND, no HSM, no palpable masses, good bowel sounds  Musculoskeletal: no joint swelling  Extremities: WWP, cap refill <2sec  Neurologic: Alert, symmetrical facies, phonates clearly, moves all extremities equally, responsive to touch, ambulates normally   Skin: no rashes  Psychological: appropriate mood/affect      Emergency Department course / medical decision-making:   History obtained by independent historian: parent or guardian  Differential diagnoses considered: viral URI, flu-like illness, viral PNA, GAS  Chronic medical conditions significantly affecting care: immunodeficient status  External records reviewed: from prior outpatient clinic visits  ED interventions:   -Hemodynamically stable, afebrile and overall nontoxic-appearing on exam  -Due to immunodeficient status and reported fevers at home blood culture, and CBC obtained and patient given ceftriaxone as most recent labs on 2/16 demonstrated an ANC of 2140.  -Symptoms most consistent with a viral URI, will test for flu and COVID.  Erythematous oropharynx, group A strep swab obtained as well.  Results for orders placed or performed during the hospital encounter of 03/24/24 (from the past 24 hour(s))   CBC and Auto Differential   Result Value Ref Range    WBC 7.3 4.4 - 11.3  x10*3/uL    nRBC 0.0 0.0 - 0.0 /100 WBCs    RBC 4.64 4.50 - 5.90 x10*6/uL    Hemoglobin 13.3 (L) 13.5 - 17.5 g/dL    Hematocrit 39.5 (L) 41.0 - 52.0 %    MCV 85 80 - 100 fL    MCH 28.7 26.0 - 34.0 pg    MCHC 33.7 32.0 - 36.0 g/dL    RDW 12.3 11.5 - 14.5 %    Platelets 162 150 - 450 x10*3/uL    Neutrophils % 65.3 40.0 - 80.0 %    Immature Granulocytes %, Automated 0.1 0.0 - 0.9 %    Lymphocytes % 15.2 13.0 - 44.0 %    Monocytes % 19.3 2.0 - 10.0 %    Eosinophils % 0.0 0.0 - 6.0 %    Basophils % 0.1 0.0 - 2.0 %    Neutrophils Absolute 4.74 1.20 - 7.70 x10*3/uL    Immature Granulocytes Absolute, Automated 0.01 0.00 - 0.70 x10*3/uL    Lymphocytes Absolute 1.10 (L) 1.20 - 4.80 x10*3/uL    Monocytes Absolute 1.40 (H) 0.10 - 1.00 x10*3/uL    Eosinophils Absolute 0.00 0.00 - 0.70 x10*3/uL    Basophils Absolute 0.01 0.00 - 0.10 x10*3/uL   Sars-CoV-2 and Influenza A/B PCR   Result Value Ref Range    Flu A Result Detected (A) Not Detected    Flu B Result Not Detected Not Detected    Coronavirus 2019, PCR Not Detected Not Detected   POCT rapid strep A   Result Value Ref Range    POC Rapid Strep Negative Negative   -Discharged with tamiflu  -No other signs of focal bacterial infection on exam, shortness of breath or desaturations thus no chest x-ray obtained  -Due to body aches given Motrin x 1.  Consultations/Patient care discussed with: pediatric hematology oncology  Diagnoses as of 03/24/24 6392   Influenza-like illness     Assessment/Plan:  19-year-old male with a history of aplastic anemia on immunosuppressive therapy presenting with 1 day history of fever and 3-day history of sick symptoms.  No signs of focal bacterial infection on exam, due to immunodeficient status blood culture and CBC obtained.  No bone marrow suppression noted on exam, WBC/Hgb/platelets appropriate, with ANC of 4740.  Due to sick symptoms consistent with a flulike illness viral swabs obtained and patient found to be influenza positive.  Patient  otherwise well-appearing, with appropriate vital signs.  Due to normal ANC and influenza as most likely cause of fever and sick symptoms patient stable for discharge.  Started on 5-day course of Tamiflu, advised to follow-up at heme-onc clinic visit tomorrow morning.  Discussed return precautions including fever after 24 hours for repeat labs and antibiotics.  Stable for discharge.     Disposition to home:  Patient is overall well appearing, improved after the above interventions, and stable for discharge home with strict return precautions.   We discussed the expected time course of symptoms.   We discussed return to care if fever after 24hrs, SOB, dehydration  Advised close follow-up with pediatrician within a few days, or sooner if symptoms worsen.  Prescriptions provided: We discussed how and when to use the prescribed medications and see Rx writer for further details.    Patient seen and discussed with pediatric emergency medicine attending Dr. Sanders.    Ijeoma Parish DO  Pediatrics PGY 3     Ijeoma Parish DO  Resident  03/24/24 [id number]

## 2024-03-25 ENCOUNTER — HOSPITAL ENCOUNTER (OUTPATIENT)
Dept: PEDIATRIC HEMATOLOGY/ONCOLOGY | Facility: HOSPITAL | Age: 19
Discharge: HOME | End: 2024-03-25
Payer: COMMERCIAL

## 2024-03-25 VITALS
SYSTOLIC BLOOD PRESSURE: 129 MMHG | WEIGHT: 187.61 LBS | HEIGHT: 71 IN | TEMPERATURE: 97 F | BODY MASS INDEX: 26.27 KG/M2 | RESPIRATION RATE: 18 BRPM | DIASTOLIC BLOOD PRESSURE: 74 MMHG | HEART RATE: 85 BPM

## 2024-03-25 DIAGNOSIS — D61.9 APLASTIC ANEMIA (MULTI): Primary | ICD-10-CM

## 2024-03-25 LAB
ALBUMIN SERPL BCP-MCNC: 4.4 G/DL (ref 3.4–5)
ALP SERPL-CCNC: 54 U/L (ref 33–120)
ALT SERPL W P-5'-P-CCNC: 16 U/L (ref 10–52)
ANION GAP SERPL CALC-SCNC: 13 MMOL/L (ref 10–20)
AST SERPL W P-5'-P-CCNC: 24 U/L (ref 9–39)
BASOPHILS # BLD AUTO: 0.01 X10*3/UL (ref 0–0.1)
BASOPHILS NFR BLD AUTO: 0.2 %
BILIRUB DIRECT SERPL-MCNC: 0.1 MG/DL (ref 0–0.3)
BILIRUB SERPL-MCNC: 1 MG/DL (ref 0–1.2)
BUN SERPL-MCNC: 16 MG/DL (ref 6–23)
CALCIUM SERPL-MCNC: 9.6 MG/DL (ref 8.6–10.6)
CHLORIDE SERPL-SCNC: 104 MMOL/L (ref 98–107)
CO2 SERPL-SCNC: 26 MMOL/L (ref 21–32)
CREAT SERPL-MCNC: 1.11 MG/DL (ref 0.5–1.3)
CYCLOSPORINE BLD IA-MCNC: 61 NG/ML (ref 80–210)
EGFRCR SERPLBLD CKD-EPI 2021: >90 ML/MIN/1.73M*2
EOSINOPHIL # BLD AUTO: 0.06 X10*3/UL (ref 0–0.7)
EOSINOPHIL NFR BLD AUTO: 1.4 %
ERYTHROCYTE [DISTWIDTH] IN BLOOD BY AUTOMATED COUNT: 12.2 % (ref 11.5–14.5)
GLUCOSE SERPL-MCNC: 116 MG/DL (ref 74–99)
HCT VFR BLD AUTO: 39.1 % (ref 41–52)
HGB BLD-MCNC: 13.4 G/DL (ref 13.5–17.5)
HGB RETIC QN: 30 PG (ref 28–38)
IMM GRANULOCYTES # BLD AUTO: 0.01 X10*3/UL (ref 0–0.7)
IMM GRANULOCYTES NFR BLD AUTO: 0.2 % (ref 0–0.9)
IMMATURE RETIC FRACTION: 6.6 %
LYMPHOCYTES # BLD AUTO: 1.44 X10*3/UL (ref 1.2–4.8)
LYMPHOCYTES NFR BLD AUTO: 33.3 %
MCH RBC QN AUTO: 29.1 PG (ref 26–34)
MCHC RBC AUTO-ENTMCNC: 34.3 G/DL (ref 32–36)
MCV RBC AUTO: 85 FL (ref 80–100)
MONOCYTES # BLD AUTO: 0.71 X10*3/UL (ref 0.1–1)
MONOCYTES NFR BLD AUTO: 16.4 %
NEUTROPHILS # BLD AUTO: 2.1 X10*3/UL (ref 1.2–7.7)
NEUTROPHILS NFR BLD AUTO: 48.5 %
NRBC BLD-RTO: 0 /100 WBCS (ref 0–0)
PHOSPHATE SERPL-MCNC: 4.6 MG/DL (ref 2.5–4.9)
PLATELET # BLD AUTO: 179 X10*3/UL (ref 150–450)
POTASSIUM SERPL-SCNC: 4.3 MMOL/L (ref 3.5–5.3)
PROT SERPL-MCNC: 7.5 G/DL (ref 6.4–8.2)
RBC # BLD AUTO: 4.6 X10*6/UL (ref 4.5–5.9)
RETICS #: 0.05 X10*6/UL (ref 0.02–0.12)
RETICS/RBC NFR AUTO: 1.2 % (ref 0.5–2)
SODIUM SERPL-SCNC: 139 MMOL/L (ref 136–145)
WBC # BLD AUTO: 4.3 X10*3/UL (ref 4.4–11.3)

## 2024-03-25 PROCEDURE — 85045 AUTOMATED RETICULOCYTE COUNT: CPT | Performed by: STUDENT IN AN ORGANIZED HEALTH CARE EDUCATION/TRAINING PROGRAM

## 2024-03-25 PROCEDURE — 82248 BILIRUBIN DIRECT: CPT | Performed by: STUDENT IN AN ORGANIZED HEALTH CARE EDUCATION/TRAINING PROGRAM

## 2024-03-25 PROCEDURE — 85025 COMPLETE CBC W/AUTO DIFF WBC: CPT | Performed by: STUDENT IN AN ORGANIZED HEALTH CARE EDUCATION/TRAINING PROGRAM

## 2024-03-25 PROCEDURE — 94645 CONT INHLJ TX EACH ADDL HOUR: CPT | Performed by: STUDENT IN AN ORGANIZED HEALTH CARE EDUCATION/TRAINING PROGRAM

## 2024-03-25 PROCEDURE — 84100 ASSAY OF PHOSPHORUS: CPT | Performed by: STUDENT IN AN ORGANIZED HEALTH CARE EDUCATION/TRAINING PROGRAM

## 2024-03-25 PROCEDURE — 80048 BASIC METABOLIC PNL TOTAL CA: CPT | Performed by: STUDENT IN AN ORGANIZED HEALTH CARE EDUCATION/TRAINING PROGRAM

## 2024-03-25 PROCEDURE — 94644 CONT INHLJ TX 1ST HOUR: CPT | Performed by: STUDENT IN AN ORGANIZED HEALTH CARE EDUCATION/TRAINING PROGRAM

## 2024-03-25 PROCEDURE — 2500000004 HC RX 250 GENERAL PHARMACY W/ HCPCS (ALT 636 FOR OP/ED): Performed by: STUDENT IN AN ORGANIZED HEALTH CARE EDUCATION/TRAINING PROGRAM

## 2024-03-25 PROCEDURE — 80158 DRUG ASSAY CYCLOSPORINE: CPT | Performed by: STUDENT IN AN ORGANIZED HEALTH CARE EDUCATION/TRAINING PROGRAM

## 2024-03-25 PROCEDURE — 2500000002 HC RX 250 W HCPCS SELF ADMINISTERED DRUGS (ALT 637 FOR MEDICARE OP, ALT 636 FOR OP/ED): Performed by: STUDENT IN AN ORGANIZED HEALTH CARE EDUCATION/TRAINING PROGRAM

## 2024-03-25 PROCEDURE — 36415 COLL VENOUS BLD VENIPUNCTURE: CPT | Performed by: STUDENT IN AN ORGANIZED HEALTH CARE EDUCATION/TRAINING PROGRAM

## 2024-03-25 RX ORDER — CYCLOSPORINE 25 MG/1
75 CAPSULE, GELATIN COATED ORAL NIGHTLY
Qty: 90 CAPSULE | Refills: 1 | Status: SHIPPED | OUTPATIENT
Start: 2024-03-25 | End: 2024-05-24

## 2024-03-25 RX ORDER — ALBUTEROL SULFATE 0.83 MG/ML
5 SOLUTION RESPIRATORY (INHALATION) ONCE
Status: COMPLETED | OUTPATIENT
Start: 2024-03-25 | End: 2024-03-25

## 2024-03-25 RX ORDER — PENTAMIDINE ISETHIONATE 300 MG/300MG
300 INHALANT RESPIRATORY (INHALATION) ONCE
Status: CANCELLED | OUTPATIENT
Start: 2024-04-22 | End: 2024-04-22

## 2024-03-25 RX ORDER — ALBUTEROL SULFATE 0.83 MG/ML
2.5 SOLUTION RESPIRATORY (INHALATION) ONCE AS NEEDED
OUTPATIENT
Start: 2024-04-22

## 2024-03-25 RX ORDER — DIPHENHYDRAMINE HYDROCHLORIDE 50 MG/ML
50 INJECTION INTRAMUSCULAR; INTRAVENOUS ONCE AS NEEDED
OUTPATIENT
Start: 2024-04-22

## 2024-03-25 RX ORDER — EPINEPHRINE 1 MG/ML
0.01 INJECTION, SOLUTION, CONCENTRATE INTRAVENOUS ONCE AS NEEDED
OUTPATIENT
Start: 2024-04-22

## 2024-03-25 RX ORDER — ALBUTEROL SULFATE 0.83 MG/ML
5 SOLUTION RESPIRATORY (INHALATION) ONCE
Status: CANCELLED | OUTPATIENT
Start: 2024-04-22 | End: 2024-04-22

## 2024-03-25 RX ORDER — PENTAMIDINE ISETHIONATE 300 MG/300MG
300 INHALANT RESPIRATORY (INHALATION) ONCE
Status: COMPLETED | OUTPATIENT
Start: 2024-03-25 | End: 2024-03-25

## 2024-03-25 RX ADMIN — PENTAMIDINE ISETHIONATE 300 MG: 300 INHALANT RESPIRATORY (INHALATION) at 11:15

## 2024-03-25 RX ADMIN — ALBUTEROL SULFATE 5 MG: 2.5 SOLUTION RESPIRATORY (INHALATION) at 10:57

## 2024-03-25 ASSESSMENT — ENCOUNTER SYMPTOMS
SHORTNESS OF BREATH: 0
DEPRESSION: 0
COUGH: 1
NEUROLOGICAL NEGATIVE: 1
MYALGIAS: 1
NUMBNESS: 0
SLEEP DISTURBANCE: 0
OCCASIONAL FEELINGS OF UNSTEADINESS: 0
EYES NEGATIVE: 1
CHEST TIGHTNESS: 0
RECTAL PAIN: 0
FATIGUE: 0
PALPITATIONS: 0
RHINORRHEA: 1
ABDOMINAL PAIN: 0
HEMATOLOGIC/LYMPHATIC NEGATIVE: 1
ENDOCRINE NEGATIVE: 1
DIZZINESS: 0
FEVER: 1
HEADACHES: 0
ADENOPATHY: 0
BRUISES/BLEEDS EASILY: 0
COLOR CHANGE: 0
ACTIVITY CHANGE: 0
UNEXPECTED WEIGHT CHANGE: 0
PSYCHIATRIC NEGATIVE: 1
APPETITE CHANGE: 0
LOSS OF SENSATION IN FEET: 0
EYE PAIN: 0
BLOOD IN STOOL: 0
CARDIOVASCULAR NEGATIVE: 1

## 2024-03-25 ASSESSMENT — PAIN SCALES - GENERAL: PAINLEVEL: 0-NO PAIN

## 2024-03-25 NOTE — PROGRESS NOTES
Patient ID: Joaquin Talbot is a 19 y.o. male.  Referring Physician: Julio Villeda MD  [id number]  Pediatrics-Hematology [place] [address]  Primary Care Provider: No primary care provider on file.    Date of Service:  3/25/2024    SUBJECTIVE:    History of Present Illness:  Joaquin is a 19 year old male with severe aplastic anemia on immunosuppressive therapy here for follow up.      Joaquin was last seen in [facility] on 2/26/24. Joaquin was seen at [facility] yesterday for fever with Tmax 102F, associated with cough, rhinorrhea, nasal congestion, sore throat and body aches x 4 days, in which he tested positive for Flu A. He received IV antibiotics and was discharged on Tamiflu but has not taken Tamiflu. Joaquin reports that he is feeling better today. He reports missing Cyclosporine doses on Friday and Saturday as he was not feeling well. He was previously fatigued over the last week but his energy levels are improved today. Denies any chest pain, palpitations, shortness of breath at rest or on exertion, dizziness, syncopal episodes or headaches. Denies any easy bruising or bleeding including hematuria, hematochezia, melena, gum bleeding or recent epistaxis. Denies any oral sores, throat pain or rectal pain. Joaquin has no new concerns today.        Past Medical History: Joaquin has a past medical history of Body mass index (BMI) pediatric, 5th percentile to less than 85th percentile for age (04/30/2022), Encounter for examination for participation in sport (08/02/2021), Encounter for routine child health examination without abnormal findings (08/31/2020), Essential (primary) hypertension, Hemorrhage due to vascular prosthetic devices, implants and grafts, initial encounter (CMS/[facility]) (03/08/2022), Other conditions influencing health status (12/22/2021), Other disorders affecting eyelid function (02/03/2017), Personal history of other diseases of the respiratory  "system (12/23/2021), Personal history of other diseases of the respiratory system (12/22/2021), Personal history of other drug therapy (01/28/2022), Personal history of other specified conditions (12/22/2021), Strain of adductor muscle, fascia and tendon of unspecified thigh, initial encounter (09/15/2021), and Unspecified injury of abdomen, initial encounter (02/03/2017).    Surgical History:  Joaquin has a past surgical history that includes Other surgical history (08/04/2022) and Other surgical history (08/04/2022).    Social History:  Joaquin     Family History   Problem Relation Name Age of Onset    Diabetes Father         Review of Systems   Constitutional:  Positive for fever. Negative for activity change, appetite change, fatigue and unexpected weight change.   HENT:  Positive for congestion and rhinorrhea.    Eyes: Negative.  Negative for pain.   Respiratory:  Positive for cough. Negative for chest tightness and shortness of breath.    Cardiovascular: Negative.  Negative for chest pain, palpitations and leg swelling.   Gastrointestinal:  Negative for abdominal pain, blood in stool and rectal pain.   Endocrine: Negative.    Genitourinary: Negative.    Musculoskeletal:  Positive for myalgias.   Skin: Negative.  Negative for color change and rash.   Allergic/Immunologic: Positive for immunocompromised state.   Neurological: Negative.  Negative for dizziness, numbness and headaches.   Hematological: Negative.  Negative for adenopathy. Does not bruise/bleed easily.   Psychiatric/Behavioral: Negative.  Negative for behavioral problems and sleep disturbance.    All other systems reviewed and are negative.      VS:  /74 (BP Location: Right arm, Patient Position: Sitting, BP Cuff Size: Large adult)   Pulse 85   Temp 36.1 °C (97 °F) (Tympanic)   Resp 18   Ht 1.811 m (5' 11.3\")   Wt 85.1 kg (187 lb 9.8 oz)   BMI 25.95 kg/m²   BSA: 2.07 meters squared    Physical Exam  Vitals and nursing note reviewed. "   Constitutional:       General: He is not in acute distress.     Appearance: Normal appearance. He is normal weight. He is not ill-appearing or toxic-appearing.   HENT:      Head: Normocephalic and atraumatic.      Nose: Nose normal.      Mouth/Throat:      Mouth: Mucous membranes are moist.      Pharynx: No oropharyngeal exudate or posterior oropharyngeal erythema.      Comments: Small oral sore at right molar region with surrounding erythema  Eyes:      Extraocular Movements: Extraocular movements intact.      Conjunctiva/sclera: Conjunctivae normal.      Pupils: Pupils are equal, round, and reactive to light.      Comments: Minimal scleral icterus   Cardiovascular:      Rate and Rhythm: Normal rate and regular rhythm.      Pulses: Normal pulses.      Heart sounds: Normal heart sounds. No murmur heard.  Pulmonary:      Effort: Pulmonary effort is normal.      Breath sounds: Normal breath sounds.   Abdominal:      General: Bowel sounds are normal. There is no distension.      Palpations: Abdomen is soft. There is no mass.      Tenderness: There is no abdominal tenderness. There is no guarding.   Musculoskeletal:         General: Normal range of motion.      Cervical back: Normal range of motion.   Skin:     General: Skin is warm.      Capillary Refill: Capillary refill takes less than 2 seconds.      Coloration: Skin is not pale.      Findings: No bruising or rash.   Neurological:      General: No focal deficit present.      Mental Status: He is alert and oriented to person, place, and time. Mental status is at baseline.   Psychiatric:         Mood and Affect: Mood normal.         Laboratory:   Latest Reference Range & Units 03/25/24 09:56   WBC 4.4 - 11.3 x10*3/uL 4.3 (L)   nRBC 0.0 - 0.0 /100 WBCs 0.0   RBC 4.50 - 5.90 x10*6/uL 4.60   HEMOGLOBIN 13.5 - 17.5 g/dL 13.4 (L)   HEMATOCRIT 41.0 - 52.0 % 39.1 (L)   MCV 80 - 100 fL 85   MCH 26.0 - 34.0 pg 29.1   MCHC 32.0 - 36.0 g/dL 34.3   RED CELL DISTRIBUTION WIDTH  11.5 - 14.5 % 12.2   Platelets 150 - 450 x10*3/uL 179   Neutrophils % 40.0 - 80.0 % 48.5   Immature Granulocytes %, Automated 0.0 - 0.9 % 0.2   Lymphocytes % 13.0 - 44.0 % 33.3   Monocytes % 2.0 - 10.0 % 16.4   Eosinophils % 0.0 - 6.0 % 1.4   Basophils % 0.0 - 2.0 % 0.2   Neutrophils Absolute 1.20 - 7.70 x10*3/uL 2.10   Immature Granulocytes Absolute, Automated 0.00 - 0.70 x10*3/uL 0.01   Lymphocytes Absolute 1.20 - 4.80 x10*3/uL 1.44   Monocytes Absolute 0.10 - 1.00 x10*3/uL 0.71   Eosinophils Absolute 0.00 - 0.70 x10*3/uL 0.06   Basophils Absolute 0.00 - 0.10 x10*3/uL 0.01      Latest Reference Range & Units 03/25/24 09:56   GLUCOSE 74 - 99 mg/dL 116 (H)   SODIUM 136 - 145 mmol/L 139   POTASSIUM 3.5 - 5.3 mmol/L 4.3   CHLORIDE 98 - 107 mmol/L 104   Bicarbonate 21 - 32 mmol/L 26   Anion Gap 10 - 20 mmol/L 13   Blood Urea Nitrogen 6 - 23 mg/dL 16   Creatinine 0.50 - 1.30 mg/dL 1.11   EGFR >60 mL/min/1.73m*2 >90   Calcium 8.6 - 10.6 mg/dL 9.6   PHOSPHORUS 2.5 - 4.9 mg/dL 4.6   Albumin 3.4 - 5.0 g/dL 4.4   Alkaline Phosphatase 33 - 120 U/L 54   ALT 10 - 52 U/L 16   AST 9 - 39 U/L 24   Bilirubin Total 0.0 - 1.2 mg/dL 1.0   Bilirubin, Direct 0.0 - 0.3 mg/dL 0.1   Total Protein 6.4 - 8.2 g/dL 7.5      Latest Reference Range & Units 03/25/24 09:56   Retic % 0.5 - 2.0 % 1.2      Latest Reference Range & Units 03/25/24 09:56   Cyclosporine 80 - 210 ng/mL 61 (L)       ASSESSMENT and PLAN:    Assessment:  Joaquin is a 19 year old male with severe aplastic anemia here for follow up. He is s/p ATG (2/23-2/26/2022) and is currently on CSA and Eltrombopag. His course was complicated by steroid induced hyperglycemia/hypertension (resolved), CSA induced gingival hyperplasia (responded to Azithomycin and gum reduction procedure by periodontist) which is also resolved now. He has responded to immunosuppressive therapy with count recovery which has since been stable. His last PRBC transfusion was on 4/1/22 and platelets were on  4/25/22 (given prior to dental procedure). He is currently on cyclosporine 300mg daily (200mg AM and 100mg PM) but has had sub-optimal adherence with suboptimal therapeutic levels since 1/29/24, in which his level today is 61. CBC today is stable with Hb 13.4, WBC 4.3 with normal ANC 2100, and normal platelet count 179K.      Plan:  Aplastic Anemia:  - Will begin Cyclosporine wean by ~10%, therefore will decrease dose from 300 mg daily (200mg AM and 100mg PM) -> 275mg daily (200mg AM and 75mg PM)  - Repeat Cyclosporine level in 4 weeks (advised to hold that morning's dose before level is drawn)  - Continue Promacta 150mg daily  - PJP prophylaxis: Received Pentamidine today. Will continue q4 weeks  - Fungal prophylaxis: Continue Fluconazole 400mg daily    PNH:  - Last PNH profile on 2/26/24 was positive. Will repeat PNH profile annually or before if indicated.      Plan discussed with patient who voiced understanding and all questions were answered  Patient was seen and discussed with Pediatric Hematologist/Oncologist, Dr. Kamran Hill MD

## 2024-03-25 NOTE — PATIENT INSTRUCTIONS
PLEASE CALL your medical team at [phone] for any questions, concerns &/or the following reasons:  -Fever: temperature  greater than 100.4 F  -Low grade temperature less than 100.4F that occurs 2 times within a 12 hour period  -Shaking chills or shivering with or without fever.  -Uncontrolled nausea or vomiting.  -No bowel movement/stool in two days or for frequent episodes of diarrhea.  -Uncontrolled bleeding or bruising.    ADDITIONAL INSTRUCTIONS:  -Follow the treatment calendar provided for you.  -Take all medications as prescribed.  -DO NOT take or give tylenol or ibuprofen without contacting your medical team first.    In order to provide safe and effective care to you and all of our patients, we are asking that if you or your child is experiencing any of the symptoms below that you please call our triage nurse [phone] prior to your arrival.    These symptoms include but are not limited to:   Fevers within the last 24 hours   Uncontrolled pain   Vomiting or diarrhea   Coughing or runny nose   Bleeding actively and lasting longer than 15 minutes (including nose bleeds, gum bleeding, etc.)   Dizziness and/or weakness   Any rash   Changes in mental status

## 2024-03-28 LAB — BACTERIA BLD CULT: NORMAL

## 2024-04-22 ENCOUNTER — HOSPITAL ENCOUNTER (OUTPATIENT)
Dept: PEDIATRIC HEMATOLOGY/ONCOLOGY | Facility: HOSPITAL | Age: 19
Discharge: HOME | End: 2024-04-22
Payer: COMMERCIAL

## 2024-04-22 VITALS
TEMPERATURE: 97.3 F | HEIGHT: 72 IN | SYSTOLIC BLOOD PRESSURE: 115 MMHG | WEIGHT: 191.58 LBS | HEART RATE: 74 BPM | DIASTOLIC BLOOD PRESSURE: 59 MMHG | RESPIRATION RATE: 20 BRPM | BODY MASS INDEX: 25.95 KG/M2

## 2024-04-22 DIAGNOSIS — D84.9 IMMUNOCOMPROMISED STATE (MULTI): ICD-10-CM

## 2024-04-22 DIAGNOSIS — Z51.81: ICD-10-CM

## 2024-04-22 DIAGNOSIS — Z79.621: ICD-10-CM

## 2024-04-22 DIAGNOSIS — D61.9 APLASTIC ANEMIA (MULTI): Primary | ICD-10-CM

## 2024-04-22 DIAGNOSIS — D84.821: ICD-10-CM

## 2024-04-22 LAB
ALBUMIN SERPL BCP-MCNC: 4.4 G/DL (ref 3.4–5)
ALP SERPL-CCNC: 59 U/L (ref 33–120)
ALT SERPL W P-5'-P-CCNC: 17 U/L (ref 10–52)
ANION GAP SERPL CALC-SCNC: 12 MMOL/L (ref 10–20)
AST SERPL W P-5'-P-CCNC: 18 U/L (ref 9–39)
BASOPHILS # BLD AUTO: 0.03 X10*3/UL (ref 0–0.1)
BASOPHILS NFR BLD AUTO: 0.5 %
BILIRUB DIRECT SERPL-MCNC: 0.1 MG/DL (ref 0–0.3)
BILIRUB SERPL-MCNC: 0.7 MG/DL (ref 0–1.2)
BUN SERPL-MCNC: 20 MG/DL (ref 6–23)
CALCIUM SERPL-MCNC: 9.5 MG/DL (ref 8.6–10.6)
CHLORIDE SERPL-SCNC: 104 MMOL/L (ref 98–107)
CO2 SERPL-SCNC: 27 MMOL/L (ref 21–32)
CREAT SERPL-MCNC: 1.04 MG/DL (ref 0.5–1.3)
CYCLOSPORINE BLD IA-MCNC: 147 NG/ML (ref 80–210)
EGFRCR SERPLBLD CKD-EPI 2021: >90 ML/MIN/1.73M*2
EOSINOPHIL # BLD AUTO: 0.18 X10*3/UL (ref 0–0.7)
EOSINOPHIL NFR BLD AUTO: 3.2 %
ERYTHROCYTE [DISTWIDTH] IN BLOOD BY AUTOMATED COUNT: 12.4 % (ref 11.5–14.5)
GLUCOSE SERPL-MCNC: 95 MG/DL (ref 74–99)
HCT VFR BLD AUTO: 37.9 % (ref 41–52)
HGB BLD-MCNC: 12.6 G/DL (ref 13.5–17.5)
HGB RETIC QN: 32 PG (ref 28–38)
IMM GRANULOCYTES # BLD AUTO: 0.02 X10*3/UL (ref 0–0.7)
IMM GRANULOCYTES NFR BLD AUTO: 0.4 % (ref 0–0.9)
IMMATURE RETIC FRACTION: 10.7 %
LYMPHOCYTES # BLD AUTO: 2.2 X10*3/UL (ref 1.2–4.8)
LYMPHOCYTES NFR BLD AUTO: 39.3 %
MCH RBC QN AUTO: 28.6 PG (ref 26–34)
MCHC RBC AUTO-ENTMCNC: 33.2 G/DL (ref 32–36)
MCV RBC AUTO: 86 FL (ref 80–100)
MONOCYTES # BLD AUTO: 0.66 X10*3/UL (ref 0.1–1)
MONOCYTES NFR BLD AUTO: 11.8 %
NEUTROPHILS # BLD AUTO: 2.51 X10*3/UL (ref 1.2–7.7)
NEUTROPHILS NFR BLD AUTO: 44.8 %
NRBC BLD-RTO: 0 /100 WBCS (ref 0–0)
PHOSPHATE SERPL-MCNC: 4.2 MG/DL (ref 2.5–4.9)
PLATELET # BLD AUTO: 187 X10*3/UL (ref 150–450)
POTASSIUM SERPL-SCNC: 4.1 MMOL/L (ref 3.5–5.3)
PROT SERPL-MCNC: 7.4 G/DL (ref 6.4–8.2)
RBC # BLD AUTO: 4.41 X10*6/UL (ref 4.5–5.9)
RETICS #: 0.07 X10*6/UL (ref 0.02–0.12)
RETICS/RBC NFR AUTO: 1.7 % (ref 0.5–2)
SODIUM SERPL-SCNC: 139 MMOL/L (ref 136–145)
WBC # BLD AUTO: 5.6 X10*3/UL (ref 4.4–11.3)

## 2024-04-22 PROCEDURE — 82248 BILIRUBIN DIRECT: CPT | Performed by: STUDENT IN AN ORGANIZED HEALTH CARE EDUCATION/TRAINING PROGRAM

## 2024-04-22 PROCEDURE — 99215 OFFICE O/P EST HI 40 MIN: CPT | Performed by: PEDIATRICS

## 2024-04-22 PROCEDURE — 94642 AEROSOL INHALATION TREATMENT: CPT | Performed by: PEDIATRICS

## 2024-04-22 PROCEDURE — 84100 ASSAY OF PHOSPHORUS: CPT | Performed by: STUDENT IN AN ORGANIZED HEALTH CARE EDUCATION/TRAINING PROGRAM

## 2024-04-22 PROCEDURE — 2500000002 HC RX 250 W HCPCS SELF ADMINISTERED DRUGS (ALT 637 FOR MEDICARE OP, ALT 636 FOR OP/ED): Performed by: STUDENT IN AN ORGANIZED HEALTH CARE EDUCATION/TRAINING PROGRAM

## 2024-04-22 PROCEDURE — 80158 DRUG ASSAY CYCLOSPORINE: CPT | Performed by: STUDENT IN AN ORGANIZED HEALTH CARE EDUCATION/TRAINING PROGRAM

## 2024-04-22 PROCEDURE — 80048 BASIC METABOLIC PNL TOTAL CA: CPT | Performed by: STUDENT IN AN ORGANIZED HEALTH CARE EDUCATION/TRAINING PROGRAM

## 2024-04-22 PROCEDURE — 36415 COLL VENOUS BLD VENIPUNCTURE: CPT | Performed by: STUDENT IN AN ORGANIZED HEALTH CARE EDUCATION/TRAINING PROGRAM

## 2024-04-22 PROCEDURE — 2500000004 HC RX 250 GENERAL PHARMACY W/ HCPCS (ALT 636 FOR OP/ED): Performed by: STUDENT IN AN ORGANIZED HEALTH CARE EDUCATION/TRAINING PROGRAM

## 2024-04-22 PROCEDURE — 85045 AUTOMATED RETICULOCYTE COUNT: CPT | Performed by: STUDENT IN AN ORGANIZED HEALTH CARE EDUCATION/TRAINING PROGRAM

## 2024-04-22 PROCEDURE — 85025 COMPLETE CBC W/AUTO DIFF WBC: CPT | Performed by: STUDENT IN AN ORGANIZED HEALTH CARE EDUCATION/TRAINING PROGRAM

## 2024-04-22 RX ORDER — ALBUTEROL SULFATE 0.83 MG/ML
5 SOLUTION RESPIRATORY (INHALATION) ONCE
Status: COMPLETED | OUTPATIENT
Start: 2024-04-22 | End: 2024-04-22

## 2024-04-22 RX ORDER — PENTAMIDINE ISETHIONATE 300 MG/300MG
300 INHALANT RESPIRATORY (INHALATION) ONCE
Status: CANCELLED | OUTPATIENT
Start: 2024-05-20 | End: 2024-05-20

## 2024-04-22 RX ORDER — PENTAMIDINE ISETHIONATE 300 MG/300MG
300 INHALANT RESPIRATORY (INHALATION) ONCE
Status: COMPLETED | OUTPATIENT
Start: 2024-04-22 | End: 2024-04-22

## 2024-04-22 RX ORDER — ALBUTEROL SULFATE 0.83 MG/ML
5 SOLUTION RESPIRATORY (INHALATION) ONCE
Status: CANCELLED | OUTPATIENT
Start: 2024-05-20 | End: 2024-05-20

## 2024-04-22 RX ORDER — ALBUTEROL SULFATE 0.83 MG/ML
2.5 SOLUTION RESPIRATORY (INHALATION) ONCE AS NEEDED
Status: CANCELLED | OUTPATIENT
Start: 2024-05-20

## 2024-04-22 RX ORDER — DIPHENHYDRAMINE HYDROCHLORIDE 50 MG/ML
50 INJECTION INTRAMUSCULAR; INTRAVENOUS ONCE AS NEEDED
Status: CANCELLED | OUTPATIENT
Start: 2024-05-20

## 2024-04-22 RX ORDER — EPINEPHRINE 1 MG/ML
0.01 INJECTION, SOLUTION, CONCENTRATE INTRAVENOUS ONCE AS NEEDED
Status: CANCELLED | OUTPATIENT
Start: 2024-05-20

## 2024-04-22 RX ADMIN — PENTAMIDINE ISETHIONATE 300 MG: 300 INHALANT RESPIRATORY (INHALATION) at 10:11

## 2024-04-22 RX ADMIN — ALBUTEROL SULFATE 5 MG: 2.5 SOLUTION RESPIRATORY (INHALATION) at 10:01

## 2024-04-22 ASSESSMENT — ENCOUNTER SYMPTOMS
HEADACHES: 0
ENDOCRINE NEGATIVE: 1
CARDIOVASCULAR NEGATIVE: 1
MYALGIAS: 0
DEPRESSION: 0
BLOOD IN STOOL: 0
PSYCHIATRIC NEGATIVE: 1
NEUROLOGICAL NEGATIVE: 1
CHEST TIGHTNESS: 0
UNEXPECTED WEIGHT CHANGE: 0
NUMBNESS: 0
SHORTNESS OF BREATH: 0
FEVER: 0
ADENOPATHY: 0
PALPITATIONS: 0
RHINORRHEA: 0
COUGH: 1
ABDOMINAL PAIN: 0
FATIGUE: 0
COLOR CHANGE: 0
OCCASIONAL FEELINGS OF UNSTEADINESS: 0
EYE PAIN: 0
EYES NEGATIVE: 1
BRUISES/BLEEDS EASILY: 0
APPETITE CHANGE: 0
DIZZINESS: 0
LOSS OF SENSATION IN FEET: 0
SLEEP DISTURBANCE: 0
ACTIVITY CHANGE: 0
RECTAL PAIN: 0
HEMATOLOGIC/LYMPHATIC NEGATIVE: 1

## 2024-04-22 ASSESSMENT — PAIN SCALES - GENERAL: PAINLEVEL: 0-NO PAIN

## 2024-04-22 NOTE — PROGRESS NOTES
Patient ID: Joaquin Talbot is a 19 y.o. male.  Referring Physician: Julio Villeda MD  [id number]  Pediatrics-Hematology [place] [address]  Primary Care Provider: No primary care provider on file.    Date of Service:  4/22/2024    SUBJECTIVE:    History of Present Illness:  Joaquin is a 19 year old male with severe aplastic anemia on immunosuppressive therapy here for follow up.      Joaquin was last seen in [facility] on 3/25/24. Joaquin reports that he has overall been well since his last visit. He is currently on Cyclosporine 275mg daily (200mg AM, 75mg PM), which he is mostly adherent to and may have missed one dose over the past week. He is also adherent with promacta and fluconazole. He reports a mild cough over the last two days, but no fever, rhinorrhea, nasal congestion. He reports good energy levels throughout the day. Denies any chest pain, palpitations, shortness of breath at rest or on exertion, dizziness, syncopal episodes or headaches. Denies any easy bruising or bleeding including hematuria, hematochezia, melena, gum bleeding or recent epistaxis. Denies any oral sores, throat pain or rectal pain. Joaquin has no new concerns today.        Past Medical History: Joaquin has a past medical history of Body mass index (BMI) pediatric, 5th percentile to less than 85th percentile for age (04/30/2022), Encounter for examination for participation in sport (08/02/2021), Encounter for routine child health examination without abnormal findings (08/31/2020), Essential (primary) hypertension, Hemorrhage due to vascular prosthetic devices, implants and grafts, initial encounter (CMS-HCC) (03/08/2022), Other conditions influencing health status (12/22/2021), Other disorders affecting eyelid function (02/03/2017), Personal history of other diseases of the respiratory system (12/23/2021), Personal history of other diseases of the respiratory system (12/22/2021), Personal  "history of other drug therapy (01/28/2022), Personal history of other specified conditions (12/22/2021), Strain of adductor muscle, fascia and tendon of unspecified thigh, initial encounter (09/15/2021), and Unspecified injury of abdomen, initial encounter (02/03/2017).    Surgical History:  Joaquin has a past surgical history that includes Other surgical history (08/04/2022) and Other surgical history (08/04/2022).    Social History:  Joaquin attends [employer]    Family History   Problem Relation Name Age of Onset    Diabetes Father         Review of Systems   Constitutional:  Negative for activity change, appetite change, fatigue, fever and unexpected weight change.   HENT:  Negative for congestion and rhinorrhea.    Eyes: Negative.  Negative for pain.   Respiratory:  Positive for cough. Negative for chest tightness and shortness of breath.    Cardiovascular: Negative.  Negative for chest pain, palpitations and leg swelling.   Gastrointestinal:  Negative for abdominal pain, blood in stool and rectal pain.   Endocrine: Negative.    Genitourinary: Negative.    Musculoskeletal:  Negative for myalgias.   Skin: Negative.  Negative for color change and rash.   Allergic/Immunologic: Positive for immunocompromised state.   Neurological: Negative.  Negative for dizziness, numbness and headaches.   Hematological: Negative.  Negative for adenopathy. Does not bruise/bleed easily.   Psychiatric/Behavioral: Negative.  Negative for behavioral problems and sleep disturbance.    All other systems reviewed and are negative.      VS:  /59 (BP Location: Right arm, Patient Position: Sitting, BP Cuff Size: Large adult long)   Pulse 74   Temp 36.3 °C (97.3 °F) (Oral)   Resp 20   Ht 1.824 m (5' 11.81\")   Wt 86.9 kg (191 lb 9.3 oz)   BMI 26.12 kg/m²   BSA: 2.1 meters squared    Physical Exam  Vitals and nursing note reviewed.   Constitutional:       General: He is not in acute distress.     Appearance: Normal appearance. He " is normal weight. He is not ill-appearing or toxic-appearing.   HENT:      Head: Normocephalic and atraumatic.      Nose: Nose normal.      Mouth/Throat:      Mouth: Mucous membranes are moist.      Pharynx: No oropharyngeal exudate or posterior oropharyngeal erythema.   Eyes:      General: Scleral icterus (Minimal scleral  icterus) present.      Extraocular Movements: Extraocular movements intact.      Conjunctiva/sclera: Conjunctivae normal.      Pupils: Pupils are equal, round, and reactive to light.   Cardiovascular:      Rate and Rhythm: Normal rate and regular rhythm.      Pulses: Normal pulses.      Heart sounds: Normal heart sounds. No murmur heard.  Pulmonary:      Effort: Pulmonary effort is normal. No respiratory distress.      Breath sounds: Normal breath sounds. No rhonchi or rales.   Abdominal:      General: Abdomen is flat. Bowel sounds are normal. There is no distension.      Palpations: Abdomen is soft. There is no mass.      Tenderness: There is no abdominal tenderness. There is no guarding.      Comments: No hepatosplenomegaly   Musculoskeletal:         General: Normal range of motion.      Cervical back: Normal range of motion and neck supple.   Lymphadenopathy:      Cervical: No cervical adenopathy.   Skin:     General: Skin is warm.      Capillary Refill: Capillary refill takes less than 2 seconds.      Coloration: Skin is not pale.      Findings: No bruising or rash.   Neurological:      General: No focal deficit present.      Mental Status: He is alert and oriented to person, place, and time. Mental status is at baseline.   Psychiatric:         Mood and Affect: Mood normal.         Laboratory:   Latest Reference Range & Units 04/22/24 09:18   LEUKOCYTES (10*3/UL) IN BLOOD BY AUTOMATED COUNT, [place] 4.4 - 11.3 x10*3/uL 5.6   nRBC 0.0 - 0.0 /100 WBCs 0.0   ERYTHROCYTES (10*6/UL) IN BLOOD BY AUTOMATED COUNT, [place] 4.50 - 5.90 x10*6/uL 4.41 (L)   HEMOGLOBIN 13.5 - 17.5 g/dL 12.6 (L)    HEMATOCRIT 41.0 - 52.0 % 37.9 (L)   MCV 80 - 100 fL 86   MCH 26.0 - 34.0 pg 28.6   MCHC 32.0 - 36.0 g/dL 33.2   RED CELL DISTRIBUTION WIDTH 11.5 - 14.5 % 12.4   PLATELETS (10*3/UL) IN BLOOD AUTOMATED COUNT, [facility] 150 - 450 x10*3/uL 187   NEUTROPHILS/100 LEUKOCYTES IN BLOOD BY AUTOMATED COUNT, [facility] 40.0 - 80.0 % 44.8   Immature Granulocytes %, Automated 0.0 - 0.9 % 0.4   Lymphocytes % 13.0 - 44.0 % 39.3   Monocytes % 2.0 - 10.0 % 11.8   Eosinophils % 0.0 - 6.0 % 3.2   Basophils % 0.0 - 2.0 % 0.5   NEUTROPHILS (10*3/UL) IN BLOOD BY AUTOMATED COUNT, [facility] 1.20 - 7.70 x10*3/uL 2.51   Immature Granulocytes Absolute, Automated 0.00 - 0.70 x10*3/uL 0.02   Lymphocytes Absolute 1.20 - 4.80 x10*3/uL 2.20   Monocytes Absolute 0.10 - 1.00 x10*3/uL 0.66   Eosinophils Absolute 0.00 - 0.70 x10*3/uL 0.18   Basophils Absolute 0.00 - 0.10 x10*3/uL 0.03      [facility] Reference Range & Units 04/22/24 09:18   GLUCOSE 74 - 99 mg/dL 95   SODIUM 136 - 145 mmol/L 139   POTASSIUM 3.5 - 5.3 mmol/L 4.1   CHLORIDE 98 - 107 mmol/L 104   Bicarbonate 21 - 32 mmol/L 27   Anion Gap 10 - 20 mmol/L 12   Blood Urea Nitrogen 6 - 23 mg/dL 20   Creatinine 0.50 - 1.30 mg/dL 1.04   EGFR >60 mL/min/1.73m*2 >90   Calcium 8.6 - 10.6 mg/dL 9.5   PHOSPHORUS 2.5 - 4.9 mg/dL 4.2   Albumin 3.4 - 5.0 g/dL 4.4   Alkaline Phosphatase 33 - 120 U/L 59   ALT 10 - 52 U/L 17   AST 9 - 39 U/L 18   Bilirubin Total 0.0 - 1.2 mg/dL 0.7   Bilirubin, Direct 0.0 - 0.3 mg/dL 0.1       ASSESSMENT and PLAN:    Assessment:  Joaquin is a 19 year old male with severe aplastic anemia here for follow up. He is s/p ATG (2/23-2/26/2022) and is currently on CSA and Eltrombopag. His course was complicated by steroid induced hyperglycemia/hypertension (resolved), CSA induced gingival hyperplasia (responded to Azithomycin and gum reduction procedure by periodontist) which is also resolved now. He has responded to immunosuppressive therapy with count recovery which has since been  stable. His last PRBC transfusion was on 4/1/22 and platelets were on 4/25/22 (given prior to dental procedure). He has been on Cyclosporine since March 2022, and has begun his Cyclosporine wean on 3/25/24 and is currently taking 275mg daily (200mg AM and 75mg PM). CBC today is stable with Hb 12.6, WBC 5.6 with normal ANC 2510, normal platelet count 187K, and Cyclosporine level is 147. Given stability of counts after his first wean, will continue to wean by another 10% today.     Plan:  Aplastic Anemia:  - Will continue weaning Cyclosporine by ~10%, therefore will decrease dose from 275 mg daily (200mg AM and 75mg PM) -> 250mg daily (200mg AM and 50mg PM)  - Repeat Cyclosporine level in 4 weeks (advised to hold that morning's dose before level is drawn)  - Continue Promacta 150mg daily  - PJP prophylaxis: Received Pentamidine today. Will continue q4 weeks  - Fungal prophylaxis: Continue Fluconazole 400mg daily    PNH:  - Last PNH profile on 2/26/24 was positive. Will repeat PNH profile annually or before if indicated.      Plan discussed with patient who voiced understanding and all questions were answered  Patient was seen and discussed with Pediatric Hematologist/Oncologist, Dr. Jason Hill MD

## 2024-06-17 ENCOUNTER — APPOINTMENT (OUTPATIENT)
Dept: PEDIATRIC HEMATOLOGY/ONCOLOGY | Facility: HOSPITAL | Age: 19
End: 2024-06-17
Payer: COMMERCIAL

## 2024-06-17 ENCOUNTER — HOSPITAL ENCOUNTER (OUTPATIENT)
Dept: PEDIATRIC HEMATOLOGY/ONCOLOGY | Facility: HOSPITAL | Age: 19
Discharge: HOME | End: 2024-06-17
Payer: COMMERCIAL

## 2024-06-17 VITALS
BODY MASS INDEX: 25.29 KG/M2 | WEIGHT: 186.73 LBS | SYSTOLIC BLOOD PRESSURE: 117 MMHG | RESPIRATION RATE: 20 BRPM | HEIGHT: 72 IN | DIASTOLIC BLOOD PRESSURE: 72 MMHG | TEMPERATURE: 96.4 F | HEART RATE: 77 BPM

## 2024-06-17 DIAGNOSIS — D61.9 APLASTIC ANEMIA (MULTI): Primary | ICD-10-CM

## 2024-06-17 LAB
ALBUMIN SERPL BCP-MCNC: 4.7 G/DL (ref 3.4–5)
ALP SERPL-CCNC: 62 U/L (ref 33–120)
ALT SERPL W P-5'-P-CCNC: 12 U/L (ref 10–52)
ANION GAP SERPL CALC-SCNC: 13 MMOL/L (ref 10–20)
AST SERPL W P-5'-P-CCNC: 19 U/L (ref 9–39)
BASOPHILS # BLD AUTO: 0.01 X10*3/UL (ref 0–0.1)
BASOPHILS NFR BLD AUTO: 0.2 %
BILIRUB DIRECT SERPL-MCNC: 0.2 MG/DL (ref 0–0.3)
BILIRUB SERPL-MCNC: 1.5 MG/DL (ref 0–1.2)
BUN SERPL-MCNC: 17 MG/DL (ref 6–23)
CALCIUM SERPL-MCNC: 10.2 MG/DL (ref 8.6–10.6)
CHLORIDE SERPL-SCNC: 101 MMOL/L (ref 98–107)
CO2 SERPL-SCNC: 27 MMOL/L (ref 21–32)
CREAT SERPL-MCNC: 1.07 MG/DL (ref 0.5–1.3)
CYCLOSPORINE BLD IA-MCNC: 90 NG/ML (ref 80–210)
EGFRCR SERPLBLD CKD-EPI 2021: >90 ML/MIN/1.73M*2
EOSINOPHIL # BLD AUTO: 0.08 X10*3/UL (ref 0–0.7)
EOSINOPHIL NFR BLD AUTO: 1.5 %
ERYTHROCYTE [DISTWIDTH] IN BLOOD BY AUTOMATED COUNT: 12.3 % (ref 11.5–14.5)
GLUCOSE SERPL-MCNC: 84 MG/DL (ref 74–99)
HCT VFR BLD AUTO: 37.3 % (ref 41–52)
HGB BLD-MCNC: 13.2 G/DL (ref 13.5–17.5)
HGB RETIC QN: 32 PG (ref 28–38)
IMM GRANULOCYTES # BLD AUTO: 0.01 X10*3/UL (ref 0–0.7)
IMM GRANULOCYTES NFR BLD AUTO: 0.2 % (ref 0–0.9)
IMMATURE RETIC FRACTION: 15 %
LYMPHOCYTES # BLD AUTO: 2.21 X10*3/UL (ref 1.2–4.8)
LYMPHOCYTES NFR BLD AUTO: 40.1 %
MCH RBC QN AUTO: 29.4 PG (ref 26–34)
MCHC RBC AUTO-ENTMCNC: 35.4 G/DL (ref 32–36)
MCV RBC AUTO: 83 FL (ref 80–100)
MONOCYTES # BLD AUTO: 0.59 X10*3/UL (ref 0.1–1)
MONOCYTES NFR BLD AUTO: 10.7 %
NEUTROPHILS # BLD AUTO: 2.61 X10*3/UL (ref 1.2–7.7)
NEUTROPHILS NFR BLD AUTO: 47.3 %
NRBC BLD-RTO: 0 /100 WBCS (ref 0–0)
PHOSPHATE SERPL-MCNC: 4.1 MG/DL (ref 2.5–4.9)
PLATELET # BLD AUTO: 194 X10*3/UL (ref 150–450)
POTASSIUM SERPL-SCNC: 4 MMOL/L (ref 3.5–5.3)
PROT SERPL-MCNC: 7.5 G/DL (ref 6.4–8.2)
RBC # BLD AUTO: 4.49 X10*6/UL (ref 4.5–5.9)
RETICS #: 0.08 X10*6/UL (ref 0.02–0.12)
RETICS/RBC NFR AUTO: 1.9 % (ref 0.5–2)
SODIUM SERPL-SCNC: 137 MMOL/L (ref 136–145)
WBC # BLD AUTO: 5.5 X10*3/UL (ref 4.4–11.3)

## 2024-06-17 PROCEDURE — 80158 DRUG ASSAY CYCLOSPORINE: CPT | Performed by: STUDENT IN AN ORGANIZED HEALTH CARE EDUCATION/TRAINING PROGRAM

## 2024-06-17 PROCEDURE — 84100 ASSAY OF PHOSPHORUS: CPT | Performed by: STUDENT IN AN ORGANIZED HEALTH CARE EDUCATION/TRAINING PROGRAM

## 2024-06-17 PROCEDURE — 99215 OFFICE O/P EST HI 40 MIN: CPT | Performed by: STUDENT IN AN ORGANIZED HEALTH CARE EDUCATION/TRAINING PROGRAM

## 2024-06-17 PROCEDURE — 2500000004 HC RX 250 GENERAL PHARMACY W/ HCPCS (ALT 636 FOR OP/ED): Performed by: STUDENT IN AN ORGANIZED HEALTH CARE EDUCATION/TRAINING PROGRAM

## 2024-06-17 PROCEDURE — 85045 AUTOMATED RETICULOCYTE COUNT: CPT | Performed by: STUDENT IN AN ORGANIZED HEALTH CARE EDUCATION/TRAINING PROGRAM

## 2024-06-17 PROCEDURE — 36415 COLL VENOUS BLD VENIPUNCTURE: CPT | Performed by: STUDENT IN AN ORGANIZED HEALTH CARE EDUCATION/TRAINING PROGRAM

## 2024-06-17 PROCEDURE — 94642 AEROSOL INHALATION TREATMENT: CPT | Performed by: STUDENT IN AN ORGANIZED HEALTH CARE EDUCATION/TRAINING PROGRAM

## 2024-06-17 PROCEDURE — 85025 COMPLETE CBC W/AUTO DIFF WBC: CPT | Performed by: STUDENT IN AN ORGANIZED HEALTH CARE EDUCATION/TRAINING PROGRAM

## 2024-06-17 PROCEDURE — 84075 ASSAY ALKALINE PHOSPHATASE: CPT | Performed by: STUDENT IN AN ORGANIZED HEALTH CARE EDUCATION/TRAINING PROGRAM

## 2024-06-17 PROCEDURE — 2500000002 HC RX 250 W HCPCS SELF ADMINISTERED DRUGS (ALT 637 FOR MEDICARE OP, ALT 636 FOR OP/ED): Performed by: STUDENT IN AN ORGANIZED HEALTH CARE EDUCATION/TRAINING PROGRAM

## 2024-06-17 RX ORDER — ALBUTEROL SULFATE 0.83 MG/ML
5 SOLUTION RESPIRATORY (INHALATION) ONCE
OUTPATIENT
Start: 2024-07-15 | End: 2024-07-15

## 2024-06-17 RX ORDER — PENTAMIDINE ISETHIONATE 300 MG/300MG
300 INHALANT RESPIRATORY (INHALATION) ONCE
OUTPATIENT
Start: 2024-07-15 | End: 2024-07-15

## 2024-06-17 RX ORDER — DIPHENHYDRAMINE HYDROCHLORIDE 50 MG/ML
50 INJECTION INTRAMUSCULAR; INTRAVENOUS ONCE AS NEEDED
OUTPATIENT
Start: 2024-07-15

## 2024-06-17 RX ORDER — ALBUTEROL SULFATE 0.83 MG/ML
5 SOLUTION RESPIRATORY (INHALATION) ONCE
Status: COMPLETED | OUTPATIENT
Start: 2024-06-17 | End: 2024-06-17

## 2024-06-17 RX ORDER — ALBUTEROL SULFATE 0.83 MG/ML
2.5 SOLUTION RESPIRATORY (INHALATION) ONCE AS NEEDED
OUTPATIENT
Start: 2024-07-15

## 2024-06-17 RX ORDER — EPINEPHRINE 1 MG/ML
0.01 INJECTION, SOLUTION, CONCENTRATE INTRAVENOUS ONCE AS NEEDED
OUTPATIENT
Start: 2024-07-15

## 2024-06-17 RX ORDER — PENTAMIDINE ISETHIONATE 300 MG/300MG
300 INHALANT RESPIRATORY (INHALATION) ONCE
Status: COMPLETED | OUTPATIENT
Start: 2024-06-17 | End: 2024-06-17

## 2024-06-17 ASSESSMENT — ENCOUNTER SYMPTOMS
FATIGUE: 0
UNEXPECTED WEIGHT CHANGE: 0
SLEEP DISTURBANCE: 0
BRUISES/BLEEDS EASILY: 0
COLOR CHANGE: 0
MYALGIAS: 0
NEUROLOGICAL NEGATIVE: 1
DEPRESSION: 0
RECTAL PAIN: 0
HEMATOLOGIC/LYMPHATIC NEGATIVE: 1
ABDOMINAL PAIN: 0
COUGH: 0
FEVER: 0
CARDIOVASCULAR NEGATIVE: 1
HEADACHES: 0
ENDOCRINE NEGATIVE: 1
SHORTNESS OF BREATH: 0
EYES NEGATIVE: 1
DIZZINESS: 0
OCCASIONAL FEELINGS OF UNSTEADINESS: 0
PSYCHIATRIC NEGATIVE: 1
EYE PAIN: 0
APPETITE CHANGE: 0
ACTIVITY CHANGE: 0
ADENOPATHY: 0
PALPITATIONS: 0
CHEST TIGHTNESS: 0
BLOOD IN STOOL: 0
LOSS OF SENSATION IN FEET: 0
NUMBNESS: 0
RHINORRHEA: 0

## 2024-06-17 ASSESSMENT — PAIN SCALES - GENERAL: PAINLEVEL: 0-NO PAIN

## 2024-06-17 NOTE — PROGRESS NOTES
Patient ID: Joaquin Talbot is a 19 y.o. male.  Referring Physician: Sowmya Barron MD  [id number] [facility]  Pediatrics-[place]ematology [place] [address]  Primary Care Provider: No primary care provider on file.    Date of Service:  6/17/2024    SUBJECTIVE:    History of Present Illness:  Joaquin is a 19 year old male with severe aplastic anemia on immunosuppressive therapy here for follow up.      Joaquin was last seen in [facility] on 4/22/24. Joaquin reports that he has overall been well since his last visit. He is currently on Cyclosporine 250mg daily (200mg AM, 50mg PM), which he is mostly adherent to and may missed a few doses over the last 3-4 days as he was in [place]. He is also adherent with promacta and fluconazole. No recent fever, cough, rhinorrhea or nasal congestion. He reports good energy levels throughout the day. Denies any chest pain, palpitations, shortness of breath at rest or on exertion, dizziness, syncopal episodes or headaches. Denies any easy bruising or bleeding including hematuria, hematochezia, melena, gum bleeding or recent epistaxis. Denies any oral sores, throat pain or rectal pain. Joaquin has no new concerns today.        Past Medical History: Joaquin has a past medical history of Body mass index (BMI) pediatric, 5th percentile to less than 85th percentile for age (04/30/2022), Encounter for examination for participation in sport (08/02/2021), Encounter for routine child health examination without abnormal findings (08/31/2020), Essential (primary) hypertension, Hemorrhage due to vascular prosthetic devices, implants and grafts, initial encounter (CMS-HCC) (03/08/2022), Other conditions influencing health status (12/22/2021), Other disorders affecting eyelid function (02/03/2017), Personal history of other diseases of the respiratory system (12/23/2021), Personal history of other diseases of the respiratory system (12/22/2021), Personal history of other drug  "therapy (01/28/2022), Personal history of other specified conditions (12/22/2021), Strain of adductor muscle, fascia and tendon of unspecified thigh, initial encounter (09/15/2021), and Unspecified injury of abdomen, initial encounter (02/03/2017).    Surgical History:  Joaquin has a past surgical history that includes Other surgical history (08/04/2022) and Other surgical history (08/04/2022).    Social History:  Joaquin attends college    Family History   Problem Relation Name Age of Onset    Diabetes Father         Review of Systems   Constitutional:  Negative for activity change, appetite change, fatigue, fever and unexpected weight change.   HENT:  Negative for congestion and rhinorrhea.    Eyes: Negative.  Negative for pain.   Respiratory:  Negative for cough, chest tightness and shortness of breath.    Cardiovascular: Negative.  Negative for chest pain, palpitations and leg swelling.   Gastrointestinal:  Negative for abdominal pain, blood in stool and rectal pain.   Endocrine: Negative.    Genitourinary: Negative.    Musculoskeletal:  Negative for myalgias.   Skin: Negative.  Negative for color change and rash.   Allergic/Immunologic: Positive for immunocompromised state.   Neurological: Negative.  Negative for dizziness, numbness and headaches.   Hematological: Negative.  Negative for adenopathy. Does not bruise/bleed easily.   Psychiatric/Behavioral: Negative.  Negative for behavioral problems and sleep disturbance.    All other systems reviewed and are negative.      VS:  /72 (BP Location: Right arm, Patient Position: Sitting, BP Cuff Size: Large adult long)   Pulse 77   Temp 35.8 °C (96.4 °F) (Oral)   Resp 20   Ht 1.818 m (5' 11.58\")   Wt 84.7 kg (186 lb 11.7 oz)   BMI 25.63 kg/m²   BSA: 2.07 meters squared    Physical Exam  Vitals and nursing note reviewed.   Constitutional:       General: He is not in acute distress.     Appearance: Normal appearance. He is normal weight. He is not " ill-appearing or toxic-appearing.   HENT:      Head: Normocephalic and atraumatic.      Nose: Nose normal.      Mouth/Throat:      Mouth: Mucous membranes are moist.      Pharynx: No oropharyngeal exudate or posterior oropharyngeal erythema.   Eyes:      General: No scleral icterus.     Extraocular Movements: Extraocular movements intact.      Conjunctiva/sclera: Conjunctivae normal.      Pupils: Pupils are equal, round, and reactive to light.   Cardiovascular:      Rate and Rhythm: Normal rate and regular rhythm.      Pulses: Normal pulses.      Heart sounds: Normal heart sounds. No murmur heard.  Pulmonary:      Effort: Pulmonary effort is normal. No respiratory distress.      Breath sounds: Normal breath sounds. No rhonchi or rales.   Abdominal:      General: Abdomen is flat. Bowel sounds are normal. There is no distension.      Palpations: Abdomen is soft. There is no mass.      Tenderness: There is no abdominal tenderness. There is no guarding.      Comments: No hepatosplenomegaly   Musculoskeletal:         General: Normal range of motion.      Cervical back: Normal range of motion and neck supple.   Lymphadenopathy:      Cervical: No cervical adenopathy.   Skin:     General: Skin is warm.      Capillary Refill: Capillary refill takes less than 2 seconds.      Coloration: Skin is not pale.      Findings: No bruising or rash.   Neurological:      General: No focal deficit present.      Mental Status: He is alert and oriented to person, place, and time. Mental status is at baseline.   Psychiatric:         Mood and Affect: Mood normal.         Laboratory:   Latest Reference Range & Units 06/17/24 10:18   LEUKOCYTES (10*3/UL) IN BLOOD BY AUTOMATED COUNT, [place] 4.4 - 11.3 x10*3/uL 5.5   nRBC 0.0 - 0.0 /100 WBCs 0.0   ERYTHROCYTES (10*6/UL) IN BLOOD BY AUTOMATED COUNT, [place] 4.50 - 5.90 x10*6/uL 4.49 (L)   HEMOGLOBIN 13.5 - 17.5 g/dL 13.2 (L)   HEMATOCRIT 41.0 - 52.0 % 37.3 (L)   MCV 80 - 100 fL 83   MCH 26.0 -  34.0 pg 29.4   MCHC 32.0 - 36.0 g/dL 35.4   RED CELL DISTRIBUTION WIDTH 11.5 - 14.5 % 12.3   PLATELETS (10*3/UL) IN BLOOD AUTOMATED COUNT, [facility] 150 - 450 x10*3/uL 194   NEUTROPHILS/100 LEUKOCYTES IN BLOOD BY AUTOMATED COUNT, [facility] 40.0 - 80.0 % 47.3   Immature Granulocytes %, Automated 0.0 - 0.9 % 0.2   Lymphocytes % 13.0 - 44.0 % 40.1   Monocytes % 2.0 - 10.0 % 10.7   Eosinophils % 0.0 - 6.0 % 1.5   Basophils % 0.0 - 2.0 % 0.2   NEUTROPHILS (10*3/UL) IN BLOOD BY AUTOMATED COUNT, [facility] 1.20 - 7.70 x10*3/uL 2.61   Immature Granulocytes Absolute, Automated 0.00 - 0.70 x10*3/uL 0.01   Lymphocytes Absolute 1.20 - 4.80 x10*3/uL 2.21   Monocytes Absolute 0.10 - 1.00 x10*3/uL 0.59   Eosinophils Absolute 0.00 - 0.70 x10*3/uL 0.08   Basophils Absolute 0.00 - 0.10 x10*3/uL 0.01      Latest Reference Range & Units 06/17/24 10:18   GLUCOSE 74 - 99 mg/dL 84   SODIUM 136 - 145 mmol/L 137   POTASSIUM 3.5 - 5.3 mmol/L 4.0   CHLORIDE 98 - 107 mmol/L 101   Bicarbonate 21 - 32 mmol/L 27   Anion Gap 10 - 20 mmol/L 13   Blood Urea Nitrogen 6 - 23 mg/dL 17   Creatinine 0.50 - 1.30 mg/dL 1.07   EGFR >60 mL/min/1.73m*2 >90   Calcium 8.6 - 10.6 mg/dL 10.2   PHOSPHORUS 2.5 - 4.9 mg/dL 4.1   Albumin 3.4 - 5.0 g/dL 4.7   Alkaline Phosphatase 33 - 120 U/L 62   ALT 10 - 52 U/L 12   AST 9 - 39 U/L 19   Bilirubin Total 0.0 - 1.2 mg/dL 1.5 (H)   Bilirubin, Direct 0.0 - 0.3 mg/dL 0.2      Latest Reference Range & Units 06/17/24 10:18   Retic % 0.5 - 2.0 % 1.9       ASSESSMENT and PLAN:    Assessment:  Joaquin is a 19 year old male with severe aplastic anemia here for follow up. He is s/p ATG (2/23-2/26/2022) and is currently on CSA and Eltrombopag. His course was complicated by steroid induced hyperglycemia/hypertension (resolved), CSA induced gingival hyperplasia (responded to Azithomycin and gum reduction procedure by periodontist) which is also resolved now. He has responded to immunosuppressive therapy with count recovery which has  since been stable. His last PRBC transfusion was on 4/1/22 and platelets were on 4/25/22 (given prior to dental procedure). He has been on Cyclosporine since March 2022, and has begun his Cyclosporine wean on 3/25/24 and is currently taking 250mg daily (200mg AM and 50mg PM). CBC today is stable with Hb 13.2, WBC 5.5 with normal ANC 2610, normal platelet count 194K. Given stability of counts, will continue to wean by another 10% today.     Plan:  Aplastic Anemia:  - Will continue weaning Cyclosporine by ~10%, therefore will decrease dose from 250 mg daily (200mg AM and 50mg PM) -> 225mg daily (200mg AM and 25mg PM)  - Continue Promacta 150mg daily  - PJP prophylaxis: Received Pentamidine today. Will continue q4 weeks  - Fungal prophylaxis: Continue Fluconazole 400mg daily    PNH:  - Last PNH profile on 2/26/24 was positive. Will repeat PNH profile annually or before if indicated.     RTC x 4 weeks for follow up, labs and further wean.     Plan discussed with patient who voiced understanding and all questions were answered  Patient was seen and discussed with Pediatric Hematologist/Oncologist, Dr. Kamran Hill MD

## 2024-06-17 NOTE — PATIENT INSTRUCTIONS
PLEASE CALL your medical team at [phone] for any questions, concerns &/or the following reasons:  -Fever: temperature  greater than 100.4 F  -Low grade temperature less than 100.4F that occurs 2 times within a 12 hour period  -Shaking chills or shivering with or without fever.  -Uncontrolled nausea or vomiting.  -No bowel movement/stool in two days or for frequent episodes of diarrhea.  -Uncontrolled bleeding or bruising.    ADDITIONAL INSTRUCTIONS:  -Follow the treatment calendar provided for you.  -Take all medications as prescribed.  -DO NOT take or give tylenol or ibuprofen without contacting your medical team first.    In order to provide safe and effective care to you and all of our patients, we are asking that if you or your child is experiencing any of the symptoms below that you please call our triage nurse [phone] prior to your arrival.    These symptoms include but are not limited to:   Fevers within the last 24 hours   Uncontrolled pain   Vomiting or diarrhea   Coughing or runny nose   Bleeding actively and lasting longer than 15 minutes (including nose bleeds, gum bleeding, etc.)   Dizziness and/or weakness   Any rash   Changes in mental status

## 2024-06-18 ENCOUNTER — TELEPHONE (OUTPATIENT)
Dept: PEDIATRIC HEMATOLOGY/ONCOLOGY | Facility: HOSPITAL | Age: 19
End: 2024-06-18
Payer: COMMERCIAL

## 2024-06-18 DIAGNOSIS — D61.9 APLASTIC ANEMIA (MULTI): ICD-10-CM

## 2024-06-18 DIAGNOSIS — Z79.621: ICD-10-CM

## 2024-06-18 DIAGNOSIS — D84.9 IMMUNOCOMPROMISED (MULTI): Primary | ICD-10-CM

## 2024-06-18 RX ORDER — ELTROMBOPAG OLAMINE 75 MG/1
150 TABLET, FILM COATED ORAL
Qty: 60 TABLET | Refills: 3 | Status: SHIPPED | OUTPATIENT
Start: 2024-06-18

## 2024-06-18 RX ORDER — FLUCONAZOLE 200 MG/1
400 TABLET ORAL DAILY
Qty: 60 TABLET | Refills: 1 | Status: SHIPPED | OUTPATIENT
Start: 2024-06-18 | End: 2024-08-17

## 2024-06-18 NOTE — TELEPHONE ENCOUNTER
Called and reviewed labs with Joaquin that were done in clinic yesterday. Labs are overll stable with weaning Cyclosporine. Will decrease dose from 250mg -> 225mg daily (200mg AM and 25m PM). Joaquin voiced understanding. He reports that he has adequate Cyclosporine pills (100mg pills, and 24 mg pills). Sent refills for fluconazole and promacta.

## 2024-06-28 NOTE — ADDENDUM NOTE
Encounter addended by: Julio Villeda MD on: 6/28/2024 12:33 PM   Actions taken: Level of Service modified

## 2024-07-15 ENCOUNTER — HOSPITAL ENCOUNTER (OUTPATIENT)
Dept: PEDIATRIC HEMATOLOGY/ONCOLOGY | Facility: HOSPITAL | Age: 19
Discharge: HOME | End: 2024-07-15
Payer: COMMERCIAL

## 2024-07-15 VITALS
RESPIRATION RATE: 20 BRPM | TEMPERATURE: 97.4 F | HEIGHT: 72 IN | HEART RATE: 65 BPM | SYSTOLIC BLOOD PRESSURE: 110 MMHG | WEIGHT: 188.05 LBS | BODY MASS INDEX: 25.47 KG/M2 | DIASTOLIC BLOOD PRESSURE: 71 MMHG

## 2024-07-15 DIAGNOSIS — D61.9 APLASTIC ANEMIA (MULTI): Primary | ICD-10-CM

## 2024-07-15 DIAGNOSIS — Z79.621: ICD-10-CM

## 2024-07-15 DIAGNOSIS — D84.9 IMMUNOCOMPROMISED (MULTI): ICD-10-CM

## 2024-07-15 LAB
ALBUMIN SERPL BCP-MCNC: 4.5 G/DL (ref 3.4–5)
ALP SERPL-CCNC: 56 U/L (ref 33–120)
ALT SERPL W P-5'-P-CCNC: 11 U/L (ref 10–52)
ANION GAP SERPL CALC-SCNC: 12 MMOL/L (ref 10–20)
AST SERPL W P-5'-P-CCNC: 17 U/L (ref 9–39)
BASOPHILS # BLD AUTO: 0.02 X10*3/UL (ref 0–0.1)
BASOPHILS NFR BLD AUTO: 0.4 %
BILIRUB DIRECT SERPL-MCNC: 0.2 MG/DL (ref 0–0.3)
BILIRUB SERPL-MCNC: 1 MG/DL (ref 0–1.2)
BUN SERPL-MCNC: 16 MG/DL (ref 6–23)
CALCIUM SERPL-MCNC: 9.4 MG/DL (ref 8.6–10.6)
CHLORIDE SERPL-SCNC: 105 MMOL/L (ref 98–107)
CO2 SERPL-SCNC: 27 MMOL/L (ref 21–32)
CREAT SERPL-MCNC: 1.01 MG/DL (ref 0.5–1.3)
CYCLOSPORINE BLD IA-MCNC: 59 NG/ML (ref 80–210)
EGFRCR SERPLBLD CKD-EPI 2021: >90 ML/MIN/1.73M*2
EOSINOPHIL # BLD AUTO: 0.09 X10*3/UL (ref 0–0.7)
EOSINOPHIL NFR BLD AUTO: 1.9 %
ERYTHROCYTE [DISTWIDTH] IN BLOOD BY AUTOMATED COUNT: 12.7 % (ref 11.5–14.5)
GLUCOSE SERPL-MCNC: 87 MG/DL (ref 74–99)
HCT VFR BLD AUTO: 38.2 % (ref 41–52)
HGB BLD-MCNC: 12.7 G/DL (ref 13.5–17.5)
HGB RETIC QN: 32 PG (ref 28–38)
IMM GRANULOCYTES # BLD AUTO: 0 X10*3/UL (ref 0–0.7)
IMM GRANULOCYTES NFR BLD AUTO: 0 % (ref 0–0.9)
IMMATURE RETIC FRACTION: 12.5 %
LYMPHOCYTES # BLD AUTO: 1.97 X10*3/UL (ref 1.2–4.8)
LYMPHOCYTES NFR BLD AUTO: 40.5 %
MCH RBC QN AUTO: 29 PG (ref 26–34)
MCHC RBC AUTO-ENTMCNC: 33.2 G/DL (ref 32–36)
MCV RBC AUTO: 87 FL (ref 80–100)
MONOCYTES # BLD AUTO: 0.51 X10*3/UL (ref 0.1–1)
MONOCYTES NFR BLD AUTO: 10.5 %
NEUTROPHILS # BLD AUTO: 2.27 X10*3/UL (ref 1.2–7.7)
NEUTROPHILS NFR BLD AUTO: 46.7 %
NRBC BLD-RTO: 0 /100 WBCS (ref 0–0)
PHOSPHATE SERPL-MCNC: 4.3 MG/DL (ref 2.5–4.9)
PLATELET # BLD AUTO: 175 X10*3/UL (ref 150–450)
POTASSIUM SERPL-SCNC: 4.3 MMOL/L (ref 3.5–5.3)
PROT SERPL-MCNC: 7.1 G/DL (ref 6.4–8.2)
RBC # BLD AUTO: 4.38 X10*6/UL (ref 4.5–5.9)
RETICS #: 0.07 X10*6/UL (ref 0.02–0.12)
RETICS/RBC NFR AUTO: 1.6 % (ref 0.5–2)
SODIUM SERPL-SCNC: 140 MMOL/L (ref 136–145)
WBC # BLD AUTO: 4.9 X10*3/UL (ref 4.4–11.3)

## 2024-07-15 PROCEDURE — 36415 COLL VENOUS BLD VENIPUNCTURE: CPT | Performed by: STUDENT IN AN ORGANIZED HEALTH CARE EDUCATION/TRAINING PROGRAM

## 2024-07-15 PROCEDURE — 85025 COMPLETE CBC W/AUTO DIFF WBC: CPT | Performed by: STUDENT IN AN ORGANIZED HEALTH CARE EDUCATION/TRAINING PROGRAM

## 2024-07-15 PROCEDURE — 85045 AUTOMATED RETICULOCYTE COUNT: CPT | Performed by: STUDENT IN AN ORGANIZED HEALTH CARE EDUCATION/TRAINING PROGRAM

## 2024-07-15 PROCEDURE — 80158 DRUG ASSAY CYCLOSPORINE: CPT | Performed by: STUDENT IN AN ORGANIZED HEALTH CARE EDUCATION/TRAINING PROGRAM

## 2024-07-15 PROCEDURE — 2500000004 HC RX 250 GENERAL PHARMACY W/ HCPCS (ALT 636 FOR OP/ED): Performed by: STUDENT IN AN ORGANIZED HEALTH CARE EDUCATION/TRAINING PROGRAM

## 2024-07-15 PROCEDURE — 2500000002 HC RX 250 W HCPCS SELF ADMINISTERED DRUGS (ALT 637 FOR MEDICARE OP, ALT 636 FOR OP/ED): Performed by: STUDENT IN AN ORGANIZED HEALTH CARE EDUCATION/TRAINING PROGRAM

## 2024-07-15 PROCEDURE — 94642 AEROSOL INHALATION TREATMENT: CPT | Performed by: PEDIATRICS

## 2024-07-15 PROCEDURE — 80048 BASIC METABOLIC PNL TOTAL CA: CPT | Performed by: STUDENT IN AN ORGANIZED HEALTH CARE EDUCATION/TRAINING PROGRAM

## 2024-07-15 PROCEDURE — 82248 BILIRUBIN DIRECT: CPT | Performed by: STUDENT IN AN ORGANIZED HEALTH CARE EDUCATION/TRAINING PROGRAM

## 2024-07-15 PROCEDURE — 84100 ASSAY OF PHOSPHORUS: CPT | Performed by: STUDENT IN AN ORGANIZED HEALTH CARE EDUCATION/TRAINING PROGRAM

## 2024-07-15 RX ORDER — PENTAMIDINE ISETHIONATE 300 MG/300MG
300 INHALANT RESPIRATORY (INHALATION) ONCE
Status: COMPLETED | OUTPATIENT
Start: 2024-07-15 | End: 2024-07-15

## 2024-07-15 RX ORDER — CYCLOSPORINE 100 MG/1
200 CAPSULE, LIQUID FILLED ORAL DAILY
Qty: 60 CAPSULE | Refills: 1 | Status: SHIPPED | OUTPATIENT
Start: 2024-07-15 | End: 2025-07-15

## 2024-07-15 RX ORDER — ALBUTEROL SULFATE 0.83 MG/ML
2.5 SOLUTION RESPIRATORY (INHALATION) ONCE AS NEEDED
OUTPATIENT
Start: 2024-08-12

## 2024-07-15 RX ORDER — CYCLOSPORINE 25 MG/1
25 CAPSULE, LIQUID FILLED ORAL DAILY
Qty: 30 CAPSULE | Refills: 0 | Status: SHIPPED | OUTPATIENT
Start: 2024-07-15 | End: 2025-07-15

## 2024-07-15 RX ORDER — ALBUTEROL SULFATE 0.83 MG/ML
5 SOLUTION RESPIRATORY (INHALATION) ONCE
Status: COMPLETED | OUTPATIENT
Start: 2024-07-15 | End: 2024-07-15

## 2024-07-15 RX ORDER — PENTAMIDINE ISETHIONATE 300 MG/300MG
300 INHALANT RESPIRATORY (INHALATION) ONCE
OUTPATIENT
Start: 2024-08-12 | End: 2024-08-12

## 2024-07-15 RX ORDER — ELTROMBOPAG OLAMINE 75 MG/1
150 TABLET, FILM COATED ORAL
Qty: 60 TABLET | Refills: 3 | Status: SHIPPED | OUTPATIENT
Start: 2024-07-15

## 2024-07-15 RX ORDER — ALBUTEROL SULFATE 0.83 MG/ML
5 SOLUTION RESPIRATORY (INHALATION) ONCE
OUTPATIENT
Start: 2024-08-12 | End: 2024-08-12

## 2024-07-15 RX ORDER — DIPHENHYDRAMINE HYDROCHLORIDE 50 MG/ML
50 INJECTION INTRAMUSCULAR; INTRAVENOUS ONCE AS NEEDED
OUTPATIENT
Start: 2024-08-12

## 2024-07-15 RX ORDER — EPINEPHRINE 1 MG/ML
0.01 INJECTION, SOLUTION, CONCENTRATE INTRAVENOUS ONCE AS NEEDED
OUTPATIENT
Start: 2024-08-12

## 2024-07-15 ASSESSMENT — ENCOUNTER SYMPTOMS
DIZZINESS: 0
COLOR CHANGE: 0
UNEXPECTED WEIGHT CHANGE: 0
APPETITE CHANGE: 0
FATIGUE: 0
PALPITATIONS: 0
OCCASIONAL FEELINGS OF UNSTEADINESS: 0
FEVER: 0
LOSS OF SENSATION IN FEET: 0
COUGH: 0
ENDOCRINE NEGATIVE: 1
MYALGIAS: 0
ABDOMINAL PAIN: 0
BLOOD IN STOOL: 0
RHINORRHEA: 0
EYE PAIN: 0
RECTAL PAIN: 0
ADENOPATHY: 0
BRUISES/BLEEDS EASILY: 0
DEPRESSION: 0
CHEST TIGHTNESS: 0
HEMATOLOGIC/LYMPHATIC NEGATIVE: 1
NUMBNESS: 0
HEADACHES: 0
CARDIOVASCULAR NEGATIVE: 1
ACTIVITY CHANGE: 0
SHORTNESS OF BREATH: 0
EYES NEGATIVE: 1
SLEEP DISTURBANCE: 0
PSYCHIATRIC NEGATIVE: 1
NEUROLOGICAL NEGATIVE: 1

## 2024-07-15 ASSESSMENT — PAIN SCALES - GENERAL: PAINLEVEL: 0-NO PAIN

## 2024-07-15 NOTE — PROGRESS NOTES
Patient ID: Joaquin Talbot is a 19 y.o. male.  Referring Physician: No referring provider defined for this encounter.  Primary Care Provider: No primary care provider on file.    Date of Service:  7/15/2024    SUBJECTIVE:    History of Present Illness:  Joaquin is a 19 year old male with severe aplastic anemia on immunosuppressive therapy here for follow up.      Joaquin was last seen in [facility] clinic on 6/17/24. Joaquin reports that he has overall been well since his last visit. At his last visit, he was weaned from 250mg ->225mg daily, however, Joaquin continued to take 250mg daily over the last month. He is mostly adherent and missed a few doses over the last month. He is also adherent with promacta and fluconazole. No recent fever, cough, rhinorrhea or nasal congestion. He reports good energy levels throughout the day. Denies any chest pain, palpitations, shortness of breath at rest or on exertion, dizziness, syncopal episodes or headaches. Denies any easy bruising or bleeding including hematuria, hematochezia, melena, gum bleeding or recent epistaxis. Denies any oral sores, throat pain or rectal pain. Joaquin has no new concerns today.        Past Medical History: Joaquin has a past medical history of Body mass index (BMI) pediatric, 5th percentile to less than 85th percentile for age (04/30/2022), Encounter for examination for participation in sport (08/02/2021), Encounter for routine child health examination without abnormal findings (08/31/2020), Essential (primary) hypertension, Hemorrhage due to vascular prosthetic devices, implants and grafts, initial encounter (CMS-HCC) (03/08/2022), Other conditions influencing health status (12/22/2021), Other disorders affecting eyelid function (02/03/2017), Personal history of other diseases of the respiratory system (12/23/2021), Personal history of other diseases of the respiratory system (12/22/2021), Personal history of other drug therapy  "(01/28/2022), Personal history of other specified conditions (12/22/2021), Strain of adductor muscle, fascia and tendon of unspecified thigh, initial encounter (09/15/2021), and Unspecified injury of abdomen, initial encounter (02/03/2017).    Surgical History:  Joaquin has a past surgical history that includes Other surgical history (08/04/2022) and Other surgical history (08/04/2022).    Social History:  Joaquin attends college    Family History   Problem Relation Name Age of Onset    Diabetes Father         Review of Systems   Constitutional:  Negative for activity change, appetite change, fatigue, fever and unexpected weight change.   HENT:  Negative for congestion and rhinorrhea.    Eyes: Negative.  Negative for pain.   Respiratory:  Negative for cough, chest tightness and shortness of breath.    Cardiovascular: Negative.  Negative for chest pain, palpitations and leg swelling.   Gastrointestinal:  Negative for abdominal pain, blood in stool and rectal pain.   Endocrine: Negative.    Genitourinary: Negative.    Musculoskeletal:  Negative for myalgias.   Skin: Negative.  Negative for color change and rash.   Allergic/Immunologic: Positive for immunocompromised state.   Neurological: Negative.  Negative for dizziness, numbness and headaches.   Hematological: Negative.  Negative for adenopathy. Does not bruise/bleed easily.   Psychiatric/Behavioral: Negative.  Negative for behavioral problems and sleep disturbance.    All other systems reviewed and are negative.      VS:  /71 (BP Location: Right arm, Patient Position: Sitting, BP Cuff Size: Large adult)   Pulse 65   Temp 36.3 °C (97.4 °F) (Oral)   Resp 20   Ht 1.829 m (6' 0.01\")   Wt 85.3 kg (188 lb 0.8 oz)   BMI 25.50 kg/m²   BSA: 2.08 meters squared    Physical Exam  Vitals and nursing note reviewed.   Constitutional:       General: He is not in acute distress.     Appearance: Normal appearance. He is normal weight. He is not ill-appearing or " toxic-appearing.   HENT:      Head: Normocephalic and atraumatic.      Nose: Nose normal.      Mouth/Throat:      Mouth: Mucous membranes are moist.      Pharynx: No oropharyngeal exudate or posterior oropharyngeal erythema.      Comments: No oral ulcers/sores  Eyes:      General: No scleral icterus.     Extraocular Movements: Extraocular movements intact.      Conjunctiva/sclera: Conjunctivae normal.      Pupils: Pupils are equal, round, and reactive to light.   Cardiovascular:      Rate and Rhythm: Normal rate and regular rhythm.      Pulses: Normal pulses.      Heart sounds: Normal heart sounds. No murmur heard.  Pulmonary:      Effort: Pulmonary effort is normal. No respiratory distress.      Breath sounds: Normal breath sounds. No rhonchi or rales.   Abdominal:      General: Abdomen is flat. Bowel sounds are normal. There is no distension.      Palpations: Abdomen is soft. There is no mass.      Tenderness: There is no abdominal tenderness. There is no guarding.      Comments: No hepatosplenomegaly   Musculoskeletal:         General: Normal range of motion.      Cervical back: Normal range of motion and neck supple.   Lymphadenopathy:      Cervical: No cervical adenopathy.   Skin:     General: Skin is warm.      Capillary Refill: Capillary refill takes less than 2 seconds.      Coloration: Skin is not pale.      Findings: No bruising or rash.   Neurological:      General: No focal deficit present.      Mental Status: He is alert and oriented to person, place, and time. Mental status is at baseline.   Psychiatric:         Mood and Affect: Mood normal.         Laboratory:   Latest Reference Range & Units 07/15/24 09:56   LEUKOCYTES (10*3/UL) IN BLOOD BY AUTOMATED COUNT, [place] 4.4 - 11.3 x10*3/uL 4.9   nRBC 0.0 - 0.0 /100 WBCs 0.0   ERYTHROCYTES (10*6/UL) IN BLOOD BY AUTOMATED COUNT, [place] 4.50 - 5.90 x10*6/uL 4.38 (L)   HEMOGLOBIN 13.5 - 17.5 g/dL 12.7 (L)   HEMATOCRIT 41.0 - 52.0 % 38.2 (L)   MCV 80 - 100  fL 87   MCH 26.0 - 34.0 pg 29.0   MCHC 32.0 - 36.0 g/dL 33.2   RED CELL DISTRIBUTION WIDTH 11.5 - 14.5 % 12.7   PLATELETS (10*3/UL) IN BLOOD AUTOMATED COUNT, [facility] 150 - 450 x10*3/uL 175   NEUTROPHILS/100 LEUKOCYTES IN BLOOD BY AUTOMATED COUNT, [facility] 40.0 - 80.0 % 46.7   Immature Granulocytes %, Automated 0.0 - 0.9 % 0.0   Lymphocytes % 13.0 - 44.0 % 40.5   Monocytes % 2.0 - 10.0 % 10.5   Eosinophils % 0.0 - 6.0 % 1.9   Basophils % 0.0 - 2.0 % 0.4   NEUTROPHILS (10*3/UL) IN BLOOD BY AUTOMATED COUNT, [facility] 1.20 - 7.70 x10*3/uL 2.27   Immature Granulocytes Absolute, [place] 0.00 - 0.70 x10*3/uL 0.00   Lymphocytes Absolute 1.20 - 4.80 x10*3/uL 1.97   Monocytes Absolute 0.10 - 1.00 x10*3/uL 0.51   Eosinophils Absolute 0.00 - 0.70 x10*3/uL 0.09   Basophils Absolute 0.00 - 0.10 x10*3/uL 0.02      [facility] Reference Range & Units 07/15/24 09:56   Retic % 0.5 - 2.0 % 1.6      Latest Reference Range & Units 07/15/24 09:56   GLUCOSE 74 - 99 mg/dL 87   SODIUM 136 - 145 mmol/L 140   POTASSIUM 3.5 - 5.3 mmol/L 4.3   CHLORIDE 98 - 107 mmol/L 105   Bicarbonate 21 - 32 mmol/L 27   Anion Gap 10 - 20 mmol/L 12   Blood Urea Nitrogen 6 - 23 mg/dL 16   Creatinine 0.50 - 1.30 mg/dL 1.01   EGFR >60 mL/min/1.73m*2 >90   Calcium 8.6 - 10.6 mg/dL 9.4   PHOSPHORUS 2.5 - 4.9 mg/dL 4.3   Albumin 3.4 - 5.0 g/dL 4.5   Alkaline Phosphatase 33 - 120 U/L 56   ALT 10 - 52 U/L 11   AST 9 - 39 U/L 17   Bilirubin Total 0.0 - 1.2 mg/dL 1.0   Bilirubin, Direct 0.0 - 0.3 mg/dL 0.2       ASSESSMENT and PLAN:    Assessment:  Joaquin is a 19 year old male with severe aplastic anemia here for follow up. He is s/p ATG (2/23-2/26/2022) and is currently on CSA and Eltrombopag. His course was complicated by steroid induced hyperglycemia/hypertension (resolved), CSA induced gingival hyperplasia (responded to Azithomycin and gum reduction procedure by periodontist) which is also resolved now. He has responded to immunosuppressive therapy with count  recovery which has since been stable. His last PRBC transfusion was on 4/1/22 and platelets were on 4/25/22 (given prior to dental procedure). He has been on Cyclosporine since March 2022, and has begun his Cyclosporine wean on 3/25/24 and is currently taking 250mg daily (200mg AM and 50mg PM). CBC today is stable with Hb 12.7, WBC 4.9 with normal ANC 2270, platelet count 175K. Given continued stability of counts, will proceed to wean by 10% today.     Plan:  Aplastic Anemia:  - Wean Cyclosporine by ~10%, therefore will decrease dose from 250 mg daily -> 225mg daily   - Continue Promacta 150mg daily  - PJP prophylaxis: Received Pentamidine today. Will continue q4 weeks  - Fungal prophylaxis: Continue Fluconazole 400mg daily    PNH:  - Last PNH profile on 2/26/24 was positive. Will repeat PNH profile annually or before if indicated.     RTC x 4 weeks for follow up, labs and further wean.     Plan discussed with patient who voiced understanding and all questions were answered  Patient was seen and discussed with Pediatric Hematologist/Oncologist, Dr. Jason Hill MD

## 2024-07-15 NOTE — PROGRESS NOTES
Massage Therapy / Acupuncture Note:  I visited with Confucianist today.  He is doing well and enjoying summer.  He had to transfer to [facility] due to [facility] closing.  He will have a small apartment close to campus.  He is excited to start in the fall.  I will continue to check in.

## 2024-07-15 NOTE — ADDENDUM NOTE
Encounter addended by: Mahogany Blanca LMT on: 7/15/2024 3:39 PM   Actions taken: Clinical Note Signed

## 2024-07-15 NOTE — PATIENT INSTRUCTIONS
PLEASE CALL your medical team at [phone] for any questions, concerns &/or the following reasons:  -Fever: temperature  greater than 100.4 F  -Low grade temperature less than 100.4F that occurs 2 times within a 12 hour period  -Shaking chills or shivering with or without fever.  -Uncontrolled nausea or vomiting.  -No bowel movement/stool in two days or for frequent episodes of diarrhea.  -Uncontrolled bleeding or bruising.    ADDITIONAL INSTRUCTIONS:  -Follow the treatment calendar provided for you.  -Take all medications as prescribed.  -DO NOT take or give tylenol or ibuprofen without contacting your medical team first.    In order to provide safe and effective care to you and all of our patients, we are asking that if you or your child is experiencing any of the symptoms below that you please call our triage nurse [phone] prior to your arrival.    These symptoms include but are not limited to:   Fevers within the last 24 hours   Uncontrolled pain   Vomiting or diarrhea   Coughing or runny nose   Bleeding actively and lasting longer than 15 minutes (including nose bleeds, gum bleeding, etc.)   Dizziness and/or weakness   Any rash   Changes in mental status

## 2024-07-18 NOTE — ADDENDUM NOTE
Encounter addended by: Sendy Lehman RN on: 7/18/2024 9:17 AM   Actions taken: Charge Capture section accepted

## 2024-07-30 NOTE — ADDENDUM NOTE
Encounter addended by: Sammie Gomez MD on: 7/30/2024 2:29 PM   Actions taken: Level of Service modified

## 2024-08-12 ENCOUNTER — HOSPITAL ENCOUNTER (OUTPATIENT)
Dept: PEDIATRIC HEMATOLOGY/ONCOLOGY | Facility: HOSPITAL | Age: 19
Discharge: HOME | End: 2024-08-12
Payer: COMMERCIAL

## 2024-08-12 VITALS
HEIGHT: 72 IN | BODY MASS INDEX: 25.29 KG/M2 | TEMPERATURE: 97.7 F | RESPIRATION RATE: 18 BRPM | WEIGHT: 186.73 LBS | HEART RATE: 54 BPM | SYSTOLIC BLOOD PRESSURE: 107 MMHG | DIASTOLIC BLOOD PRESSURE: 66 MMHG

## 2024-08-12 DIAGNOSIS — D61.9 APLASTIC ANEMIA (MULTI): Primary | ICD-10-CM

## 2024-08-12 LAB
ALBUMIN SERPL BCP-MCNC: 4.5 G/DL (ref 3.4–5)
ALP SERPL-CCNC: 62 U/L (ref 33–120)
ALT SERPL W P-5'-P-CCNC: 21 U/L (ref 10–52)
ANION GAP SERPL CALC-SCNC: 10 MMOL/L (ref 10–20)
AST SERPL W P-5'-P-CCNC: 21 U/L (ref 9–39)
BASOPHILS # BLD AUTO: 0.01 X10*3/UL (ref 0–0.1)
BASOPHILS NFR BLD AUTO: 0.2 %
BILIRUB DIRECT SERPL-MCNC: 0.2 MG/DL (ref 0–0.3)
BILIRUB SERPL-MCNC: 1.2 MG/DL (ref 0–1.2)
BUN SERPL-MCNC: 17 MG/DL (ref 6–23)
CALCIUM SERPL-MCNC: 9.7 MG/DL (ref 8.6–10.6)
CHLORIDE SERPL-SCNC: 104 MMOL/L (ref 98–107)
CO2 SERPL-SCNC: 30 MMOL/L (ref 21–32)
CREAT SERPL-MCNC: 1.08 MG/DL (ref 0.5–1.3)
CYCLOSPORINE BLD IA-MCNC: 200 NG/ML (ref 80–210)
EGFRCR SERPLBLD CKD-EPI 2021: >90 ML/MIN/1.73M*2
EOSINOPHIL # BLD AUTO: 0.07 X10*3/UL (ref 0–0.7)
EOSINOPHIL NFR BLD AUTO: 1.4 %
ERYTHROCYTE [DISTWIDTH] IN BLOOD BY AUTOMATED COUNT: 11.9 % (ref 11.5–14.5)
GLUCOSE SERPL-MCNC: 83 MG/DL (ref 74–99)
HCT VFR BLD AUTO: 38.6 % (ref 41–52)
HGB BLD-MCNC: 13 G/DL (ref 13.5–17.5)
HGB RETIC QN: 32 PG (ref 28–38)
IMM GRANULOCYTES # BLD AUTO: 0.01 X10*3/UL (ref 0–0.7)
IMM GRANULOCYTES NFR BLD AUTO: 0.2 % (ref 0–0.9)
IMMATURE RETIC FRACTION: 11.9 %
LYMPHOCYTES # BLD AUTO: 2.16 X10*3/UL (ref 1.2–4.8)
LYMPHOCYTES NFR BLD AUTO: 42.7 %
MCH RBC QN AUTO: 29 PG (ref 26–34)
MCHC RBC AUTO-ENTMCNC: 33.7 G/DL (ref 32–36)
MCV RBC AUTO: 86 FL (ref 80–100)
MONOCYTES # BLD AUTO: 0.47 X10*3/UL (ref 0.1–1)
MONOCYTES NFR BLD AUTO: 9.3 %
NEUTROPHILS # BLD AUTO: 2.34 X10*3/UL (ref 1.2–7.7)
NEUTROPHILS NFR BLD AUTO: 46.2 %
NRBC BLD-RTO: 0 /100 WBCS (ref 0–0)
PHOSPHATE SERPL-MCNC: 4.6 MG/DL (ref 2.5–4.9)
PLATELET # BLD AUTO: 172 X10*3/UL (ref 150–450)
POTASSIUM SERPL-SCNC: 4.3 MMOL/L (ref 3.5–5.3)
PROT SERPL-MCNC: 7.3 G/DL (ref 6.4–8.2)
RBC # BLD AUTO: 4.48 X10*6/UL (ref 4.5–5.9)
RETICS #: 0.07 X10*6/UL (ref 0.02–0.12)
RETICS/RBC NFR AUTO: 1.6 % (ref 0.5–2)
SODIUM SERPL-SCNC: 140 MMOL/L (ref 136–145)
WBC # BLD AUTO: 5.1 X10*3/UL (ref 4.4–11.3)

## 2024-08-12 PROCEDURE — 99215 OFFICE O/P EST HI 40 MIN: CPT | Performed by: STUDENT IN AN ORGANIZED HEALTH CARE EDUCATION/TRAINING PROGRAM

## 2024-08-12 PROCEDURE — 80158 DRUG ASSAY CYCLOSPORINE: CPT | Performed by: STUDENT IN AN ORGANIZED HEALTH CARE EDUCATION/TRAINING PROGRAM

## 2024-08-12 PROCEDURE — 84100 ASSAY OF PHOSPHORUS: CPT | Performed by: STUDENT IN AN ORGANIZED HEALTH CARE EDUCATION/TRAINING PROGRAM

## 2024-08-12 PROCEDURE — 85045 AUTOMATED RETICULOCYTE COUNT: CPT | Performed by: STUDENT IN AN ORGANIZED HEALTH CARE EDUCATION/TRAINING PROGRAM

## 2024-08-12 PROCEDURE — 80048 BASIC METABOLIC PNL TOTAL CA: CPT | Performed by: STUDENT IN AN ORGANIZED HEALTH CARE EDUCATION/TRAINING PROGRAM

## 2024-08-12 PROCEDURE — 2500000002 HC RX 250 W HCPCS SELF ADMINISTERED DRUGS (ALT 637 FOR MEDICARE OP, ALT 636 FOR OP/ED): Performed by: STUDENT IN AN ORGANIZED HEALTH CARE EDUCATION/TRAINING PROGRAM

## 2024-08-12 PROCEDURE — 84075 ASSAY ALKALINE PHOSPHATASE: CPT | Performed by: STUDENT IN AN ORGANIZED HEALTH CARE EDUCATION/TRAINING PROGRAM

## 2024-08-12 PROCEDURE — 36415 COLL VENOUS BLD VENIPUNCTURE: CPT | Performed by: STUDENT IN AN ORGANIZED HEALTH CARE EDUCATION/TRAINING PROGRAM

## 2024-08-12 PROCEDURE — 2500000004 HC RX 250 GENERAL PHARMACY W/ HCPCS (ALT 636 FOR OP/ED): Performed by: STUDENT IN AN ORGANIZED HEALTH CARE EDUCATION/TRAINING PROGRAM

## 2024-08-12 PROCEDURE — 85025 COMPLETE CBC W/AUTO DIFF WBC: CPT | Performed by: STUDENT IN AN ORGANIZED HEALTH CARE EDUCATION/TRAINING PROGRAM

## 2024-08-12 PROCEDURE — 94642 AEROSOL INHALATION TREATMENT: CPT | Performed by: STUDENT IN AN ORGANIZED HEALTH CARE EDUCATION/TRAINING PROGRAM

## 2024-08-12 RX ORDER — ALBUTEROL SULFATE 0.83 MG/ML
5 SOLUTION RESPIRATORY (INHALATION) ONCE
Status: COMPLETED | OUTPATIENT
Start: 2024-08-12 | End: 2024-08-12

## 2024-08-12 RX ORDER — EPINEPHRINE 1 MG/ML
0.01 INJECTION, SOLUTION, CONCENTRATE INTRAVENOUS ONCE AS NEEDED
OUTPATIENT
Start: 2024-09-09

## 2024-08-12 RX ORDER — PENTAMIDINE ISETHIONATE 300 MG/300MG
300 INHALANT RESPIRATORY (INHALATION) ONCE
OUTPATIENT
Start: 2024-09-09 | End: 2024-09-09

## 2024-08-12 RX ORDER — ALBUTEROL SULFATE 0.83 MG/ML
2.5 SOLUTION RESPIRATORY (INHALATION) ONCE AS NEEDED
OUTPATIENT
Start: 2024-09-09

## 2024-08-12 RX ORDER — PENTAMIDINE ISETHIONATE 300 MG/300MG
300 INHALANT RESPIRATORY (INHALATION) ONCE
Status: COMPLETED | OUTPATIENT
Start: 2024-08-12 | End: 2024-08-12

## 2024-08-12 RX ORDER — ALBUTEROL SULFATE 0.83 MG/ML
5 SOLUTION RESPIRATORY (INHALATION) ONCE
OUTPATIENT
Start: 2024-09-09 | End: 2024-09-09

## 2024-08-12 RX ORDER — DIPHENHYDRAMINE HYDROCHLORIDE 50 MG/ML
50 INJECTION INTRAMUSCULAR; INTRAVENOUS ONCE AS NEEDED
OUTPATIENT
Start: 2024-09-09

## 2024-08-12 ASSESSMENT — ENCOUNTER SYMPTOMS
ADENOPATHY: 0
DEPRESSION: 0
DIZZINESS: 0
EYES NEGATIVE: 1
ENDOCRINE NEGATIVE: 1
UNEXPECTED WEIGHT CHANGE: 0
HEADACHES: 0
COLOR CHANGE: 0
BLOOD IN STOOL: 0
APPETITE CHANGE: 0
SHORTNESS OF BREATH: 0
PSYCHIATRIC NEGATIVE: 1
ACTIVITY CHANGE: 0
NEUROLOGICAL NEGATIVE: 1
MYALGIAS: 0
CHEST TIGHTNESS: 0
RHINORRHEA: 0
RECTAL PAIN: 0
BRUISES/BLEEDS EASILY: 0
EYE PAIN: 0
CARDIOVASCULAR NEGATIVE: 1
NUMBNESS: 0
SLEEP DISTURBANCE: 0
COUGH: 0
FEVER: 0
PALPITATIONS: 0
HEMATOLOGIC/LYMPHATIC NEGATIVE: 1
FATIGUE: 0
LOSS OF SENSATION IN FEET: 0
OCCASIONAL FEELINGS OF UNSTEADINESS: 0
ABDOMINAL PAIN: 0

## 2024-08-12 ASSESSMENT — PAIN SCALES - GENERAL: PAINLEVEL: 0-NO PAIN

## 2024-08-12 NOTE — PROGRESS NOTES
Patient ID: Joaquin Talbot is a 19 y.o. male.  Referring Physician: No referring provider defined for this encounter.  Primary Care Provider: No primary care provider on file.    Date of Service:  8/12/2024    SUBJECTIVE:    History of Present Illness:  Joaquin is a 19 year old male with severe aplastic anemia on immunosuppressive therapy here for follow up.      Joaquin was last seen in [facility] on 7/15/24. Joaquin reports that he has overall been well since his last visit. At his last visit, he was weaned from 250mg ->225mg daily, which he reports adherence to. He is also adherent with promacta and fluconazole. No recent fever, cough, rhinorrhea or nasal congestion. He reports good energy levels throughout the day. Denies any chest pain, palpitations, shortness of breath at rest or on exertion, dizziness, syncopal episodes or headaches. Denies any easy bruising or bleeding including hematuria, hematochezia, melena, gum bleeding or recent epistaxis. Denies any oral sores, throat pain or rectal pain. Joaquin plays basketball and is able to keep with his peers. He will be starting [employer] in 2 weeks in [employer]. Joaquin has no new concerns today.        Past Medical History: Joaquin has a past medical history of Body mass index (BMI) pediatric, 5th percentile to less than 85th percentile for age (04/30/2022), Encounter for examination for participation in sport (08/02/2021), Encounter for routine child health examination without abnormal findings (08/31/2020), Essential (primary) hypertension, Hemorrhage due to vascular prosthetic devices, implants and grafts, initial encounter (CMS-HCC) (03/08/2022), Other conditions influencing health status (12/22/2021), Other disorders affecting eyelid function (02/03/2017), Personal history of other diseases of the respiratory system (12/23/2021), Personal history of other diseases of the respiratory system (12/22/2021), Personal history of other drug  "therapy (01/28/2022), Personal history of other specified conditions (12/22/2021), Strain of adductor muscle, fascia and tendon of unspecified thigh, initial encounter (09/15/2021), and Unspecified injury of abdomen, initial encounter (02/03/2017).    Surgical History:  Joaquin has a past surgical history that includes Other surgical history (08/04/2022) and Other surgical history (08/04/2022).    Social History:  Joaquin attends college    Family History   Problem Relation Name Age of Onset    Diabetes Father         Review of Systems   Constitutional:  Negative for activity change, appetite change, fatigue, fever and unexpected weight change.   HENT:  Negative for congestion and rhinorrhea.    Eyes: Negative.  Negative for pain.   Respiratory:  Negative for cough, chest tightness and shortness of breath.    Cardiovascular: Negative.  Negative for chest pain, palpitations and leg swelling.   Gastrointestinal:  Negative for abdominal pain, blood in stool and rectal pain.   Endocrine: Negative.    Genitourinary: Negative.    Musculoskeletal:  Negative for myalgias.   Skin: Negative.  Negative for color change and rash.   Allergic/Immunologic: Positive for immunocompromised state.   Neurological: Negative.  Negative for dizziness, numbness and headaches.   Hematological: Negative.  Negative for adenopathy. Does not bruise/bleed easily.   Psychiatric/Behavioral: Negative.  Negative for behavioral problems and sleep disturbance.    All other systems reviewed and are negative.      VS:  /66 (BP Location: Right arm, Patient Position: Sitting, BP Cuff Size: Adult)   Pulse 54   Temp 36.5 °C (97.7 °F) (Oral)   Resp 18   Ht 1.823 m (5' 11.77\")   Wt 84.7 kg (186 lb 11.7 oz)   BMI 25.49 kg/m²   BSA: 2.07 meters squared    Physical Exam  Vitals and nursing note reviewed.   Constitutional:       General: He is not in acute distress.     Appearance: Normal appearance. He is normal weight. He is not ill-appearing or " toxic-appearing.   HENT:      Head: Normocephalic and atraumatic.      Nose: Nose normal.      Mouth/Throat:      Mouth: Mucous membranes are moist.      Pharynx: No oropharyngeal exudate or posterior oropharyngeal erythema.      Comments: No oral ulcers/sores  Eyes:      General: No scleral icterus.     Extraocular Movements: Extraocular movements intact.      Conjunctiva/sclera: Conjunctivae normal.      Pupils: Pupils are equal, round, and reactive to light.   Cardiovascular:      Rate and Rhythm: Normal rate and regular rhythm.      Pulses: Normal pulses.      Heart sounds: Normal heart sounds. No murmur heard.  Pulmonary:      Effort: Pulmonary effort is normal. No respiratory distress.      Breath sounds: Normal breath sounds. No rhonchi or rales.   Abdominal:      General: Abdomen is flat. Bowel sounds are normal. There is no distension.      Palpations: Abdomen is soft. There is no mass.      Tenderness: There is no abdominal tenderness. There is no guarding.      Comments: No hepatosplenomegaly   Musculoskeletal:         General: Normal range of motion.      Cervical back: Normal range of motion and neck supple.   Lymphadenopathy:      Cervical: No cervical adenopathy.   Skin:     General: Skin is warm.      Capillary Refill: Capillary refill takes less than 2 seconds.      Coloration: Skin is not pale.      Findings: No bruising or rash.   Neurological:      General: No focal deficit present.      Mental Status: He is alert and oriented to person, place, and time. Mental status is at baseline.   Psychiatric:         Mood and Affect: Mood normal.         Laboratory:        ASSESSMENT and PLAN:    Assessment:  Joaquin is a 19 year old male with severe aplastic anemia here for follow up. He is s/p ATG (2/23-2/26/2022) and is currently on CSA and Eltrombopag. His course was complicated by steroid induced hyperglycemia/hypertension (resolved), CSA induced gingival hyperplasia (responded to Azithomycin and gum  reduction procedure by periodontist) which is also resolved now. He has responded to immunosuppressive therapy with count recovery which has since been stable. His last PRBC transfusion was on 4/1/22 and platelets were on 4/25/22 (given prior to dental procedure). He has been on Cyclosporine since March 2022, and has begun his Cyclosporine wean on 3/25/24 and is currently taking 225mg daily (200mg AM and 25mg PM). CBC today is stable with Hb 13, WBC 5.1 with normal ANC 2340, platelet count 172K. Given continued stability of counts, will proceed to wean by 10% today.     Plan:  Aplastic Anemia:  - Wean Cyclosporine by ~10%, therefore will decrease dose from 225mg daily -> 200mg daily   - Continue Promacta 150mg daily  - PJP prophylaxis: Received Pentamidine today. Will continue q4 weeks  - Fungal prophylaxis: Continue Fluconazole 400mg daily    PNH:  - Last PNH profile on 2/26/24 was positive. Will repeat PNH profile annually or before if indicated.     RTC x 4 weeks for follow up, labs and further wean.     Plan discussed with patient who voiced understanding and all questions were answered  Patient was seen and discussed with Pediatric Hematologist/Oncologist, Dr. Kamran Hill MD

## 2024-09-23 ENCOUNTER — APPOINTMENT (OUTPATIENT)
Dept: PEDIATRIC HEMATOLOGY/ONCOLOGY | Facility: HOSPITAL | Age: 19
End: 2024-09-23
Payer: COMMERCIAL

## 2024-09-23 DIAGNOSIS — D61.9 APLASTIC ANEMIA (MULTI): Primary | ICD-10-CM

## 2024-10-14 ENCOUNTER — HOSPITAL ENCOUNTER (OUTPATIENT)
Dept: PEDIATRIC HEMATOLOGY/ONCOLOGY | Facility: HOSPITAL | Age: 19
Discharge: HOME | End: 2024-10-14
Payer: COMMERCIAL

## 2024-10-14 VITALS
WEIGHT: 181.66 LBS | BODY MASS INDEX: 25.43 KG/M2 | DIASTOLIC BLOOD PRESSURE: 70 MMHG | HEART RATE: 73 BPM | HEIGHT: 71 IN | RESPIRATION RATE: 18 BRPM | SYSTOLIC BLOOD PRESSURE: 111 MMHG | TEMPERATURE: 96.8 F

## 2024-10-14 DIAGNOSIS — Z79.621: ICD-10-CM

## 2024-10-14 DIAGNOSIS — D61.9 APLASTIC ANEMIA (MULTI): Primary | ICD-10-CM

## 2024-10-14 DIAGNOSIS — D84.9 IMMUNOCOMPROMISED: ICD-10-CM

## 2024-10-14 LAB
ALBUMIN SERPL BCP-MCNC: 4.3 G/DL (ref 3.4–5)
ALP SERPL-CCNC: 56 U/L (ref 33–120)
ALT SERPL W P-5'-P-CCNC: 20 U/L (ref 10–52)
ANION GAP SERPL CALC-SCNC: 11 MMOL/L (ref 10–20)
AST SERPL W P-5'-P-CCNC: 22 U/L (ref 9–39)
BASOPHILS # BLD AUTO: 0.01 X10*3/UL (ref 0–0.1)
BASOPHILS NFR BLD AUTO: 0.2 %
BILIRUB DIRECT SERPL-MCNC: 0.2 MG/DL (ref 0–0.3)
BILIRUB SERPL-MCNC: 0.9 MG/DL (ref 0–1.2)
BUN SERPL-MCNC: 14 MG/DL (ref 6–23)
CALCIUM SERPL-MCNC: 9.3 MG/DL (ref 8.6–10.6)
CHLORIDE SERPL-SCNC: 105 MMOL/L (ref 98–107)
CO2 SERPL-SCNC: 28 MMOL/L (ref 21–32)
CREAT SERPL-MCNC: 1.07 MG/DL (ref 0.5–1.3)
CYCLOSPORINE BLD IA-MCNC: 224 NG/ML (ref 80–210)
EGFRCR SERPLBLD CKD-EPI 2021: >90 ML/MIN/1.73M*2
EOSINOPHIL # BLD AUTO: 0.08 X10*3/UL (ref 0–0.7)
EOSINOPHIL NFR BLD AUTO: 1.7 %
ERYTHROCYTE [DISTWIDTH] IN BLOOD BY AUTOMATED COUNT: 12.3 % (ref 11.5–14.5)
GLUCOSE SERPL-MCNC: 89 MG/DL (ref 74–99)
HCT VFR BLD AUTO: 36.7 % (ref 41–52)
HGB BLD-MCNC: 12.3 G/DL (ref 13.5–17.5)
HGB RETIC QN: 33 PG (ref 28–38)
IMM GRANULOCYTES # BLD AUTO: 0.02 X10*3/UL (ref 0–0.7)
IMM GRANULOCYTES NFR BLD AUTO: 0.4 % (ref 0–0.9)
IMMATURE RETIC FRACTION: 12.9 %
LYMPHOCYTES # BLD AUTO: 2.51 X10*3/UL (ref 1.2–4.8)
LYMPHOCYTES NFR BLD AUTO: 52 %
MCH RBC QN AUTO: 28.8 PG (ref 26–34)
MCHC RBC AUTO-ENTMCNC: 33.5 G/DL (ref 32–36)
MCV RBC AUTO: 86 FL (ref 80–100)
MONOCYTES # BLD AUTO: 0.49 X10*3/UL (ref 0.1–1)
MONOCYTES NFR BLD AUTO: 10.1 %
NEUTROPHILS # BLD AUTO: 1.72 X10*3/UL (ref 1.2–7.7)
NEUTROPHILS NFR BLD AUTO: 35.6 %
NRBC BLD-RTO: 0 /100 WBCS (ref 0–0)
PHOSPHATE SERPL-MCNC: 4.4 MG/DL (ref 2.5–4.9)
PLATELET # BLD AUTO: 150 X10*3/UL (ref 150–450)
POTASSIUM SERPL-SCNC: 4.1 MMOL/L (ref 3.5–5.3)
PROT SERPL-MCNC: 6.8 G/DL (ref 6.4–8.2)
RBC # BLD AUTO: 4.27 X10*6/UL (ref 4.5–5.9)
RETICS #: 0.06 X10*6/UL (ref 0.02–0.12)
RETICS/RBC NFR AUTO: 1.5 % (ref 0.5–2)
SODIUM SERPL-SCNC: 140 MMOL/L (ref 136–145)
WBC # BLD AUTO: 4.8 X10*3/UL (ref 4.4–11.3)

## 2024-10-14 PROCEDURE — 2500000004 HC RX 250 GENERAL PHARMACY W/ HCPCS (ALT 636 FOR OP/ED): Performed by: STUDENT IN AN ORGANIZED HEALTH CARE EDUCATION/TRAINING PROGRAM

## 2024-10-14 PROCEDURE — 80158 DRUG ASSAY CYCLOSPORINE: CPT | Performed by: STUDENT IN AN ORGANIZED HEALTH CARE EDUCATION/TRAINING PROGRAM

## 2024-10-14 PROCEDURE — 85045 AUTOMATED RETICULOCYTE COUNT: CPT | Performed by: STUDENT IN AN ORGANIZED HEALTH CARE EDUCATION/TRAINING PROGRAM

## 2024-10-14 PROCEDURE — 94642 AEROSOL INHALATION TREATMENT: CPT | Performed by: STUDENT IN AN ORGANIZED HEALTH CARE EDUCATION/TRAINING PROGRAM

## 2024-10-14 PROCEDURE — 85025 COMPLETE CBC W/AUTO DIFF WBC: CPT | Performed by: STUDENT IN AN ORGANIZED HEALTH CARE EDUCATION/TRAINING PROGRAM

## 2024-10-14 PROCEDURE — 82248 BILIRUBIN DIRECT: CPT | Performed by: STUDENT IN AN ORGANIZED HEALTH CARE EDUCATION/TRAINING PROGRAM

## 2024-10-14 PROCEDURE — 84100 ASSAY OF PHOSPHORUS: CPT | Performed by: STUDENT IN AN ORGANIZED HEALTH CARE EDUCATION/TRAINING PROGRAM

## 2024-10-14 PROCEDURE — 99215 OFFICE O/P EST HI 40 MIN: CPT | Performed by: STUDENT IN AN ORGANIZED HEALTH CARE EDUCATION/TRAINING PROGRAM

## 2024-10-14 PROCEDURE — 36415 COLL VENOUS BLD VENIPUNCTURE: CPT | Performed by: STUDENT IN AN ORGANIZED HEALTH CARE EDUCATION/TRAINING PROGRAM

## 2024-10-14 PROCEDURE — 2500000002 HC RX 250 W HCPCS SELF ADMINISTERED DRUGS (ALT 637 FOR MEDICARE OP, ALT 636 FOR OP/ED): Performed by: STUDENT IN AN ORGANIZED HEALTH CARE EDUCATION/TRAINING PROGRAM

## 2024-10-14 PROCEDURE — 80048 BASIC METABOLIC PNL TOTAL CA: CPT | Performed by: STUDENT IN AN ORGANIZED HEALTH CARE EDUCATION/TRAINING PROGRAM

## 2024-10-14 RX ORDER — PENTAMIDINE ISETHIONATE 300 MG/300MG
300 INHALANT RESPIRATORY (INHALATION) ONCE
OUTPATIENT
Start: 2024-11-04 | End: 2024-11-04

## 2024-10-14 RX ORDER — DIPHENHYDRAMINE HYDROCHLORIDE 50 MG/ML
50 INJECTION INTRAMUSCULAR; INTRAVENOUS ONCE AS NEEDED
OUTPATIENT
Start: 2024-11-04

## 2024-10-14 RX ORDER — EPINEPHRINE 1 MG/ML
0.01 INJECTION, SOLUTION, CONCENTRATE INTRAVENOUS ONCE AS NEEDED
OUTPATIENT
Start: 2024-11-04

## 2024-10-14 RX ORDER — ALBUTEROL SULFATE 0.83 MG/ML
5 SOLUTION RESPIRATORY (INHALATION) ONCE
OUTPATIENT
Start: 2024-11-04 | End: 2024-11-04

## 2024-10-14 RX ORDER — PENTAMIDINE ISETHIONATE 300 MG/300MG
300 INHALANT RESPIRATORY (INHALATION) ONCE
Status: COMPLETED | OUTPATIENT
Start: 2024-10-14 | End: 2024-10-14

## 2024-10-14 RX ORDER — ALBUTEROL SULFATE 0.83 MG/ML
2.5 SOLUTION RESPIRATORY (INHALATION) ONCE AS NEEDED
OUTPATIENT
Start: 2024-11-04

## 2024-10-14 RX ORDER — ALBUTEROL SULFATE 0.83 MG/ML
5 SOLUTION RESPIRATORY (INHALATION) ONCE
Status: COMPLETED | OUTPATIENT
Start: 2024-10-14 | End: 2024-10-14

## 2024-10-14 ASSESSMENT — ENCOUNTER SYMPTOMS
ENDOCRINE NEGATIVE: 1
APPETITE CHANGE: 0
OCCASIONAL FEELINGS OF UNSTEADINESS: 0
RECTAL PAIN: 0
FATIGUE: 0
EYES NEGATIVE: 1
MYALGIAS: 0
DEPRESSION: 0
UNEXPECTED WEIGHT CHANGE: 0
SLEEP DISTURBANCE: 0
LOSS OF SENSATION IN FEET: 0
SHORTNESS OF BREATH: 0
BLOOD IN STOOL: 0
ACTIVITY CHANGE: 0
ABDOMINAL PAIN: 0
DIZZINESS: 0
CHEST TIGHTNESS: 0
NEUROLOGICAL NEGATIVE: 1
HEADACHES: 0
ADENOPATHY: 0
PSYCHIATRIC NEGATIVE: 1
COUGH: 0
NUMBNESS: 0
EYE PAIN: 0
CARDIOVASCULAR NEGATIVE: 1
COLOR CHANGE: 0
RHINORRHEA: 0
HEMATOLOGIC/LYMPHATIC NEGATIVE: 1
PALPITATIONS: 0
BRUISES/BLEEDS EASILY: 0
FEVER: 0

## 2024-10-14 ASSESSMENT — PAIN SCALES - GENERAL: PAINLEVEL: 0-NO PAIN

## 2024-10-14 ASSESSMENT — COLUMBIA-SUICIDE SEVERITY RATING SCALE - C-SSRS
1. IN THE PAST MONTH, HAVE YOU WISHED YOU WERE DEAD OR WISHED YOU COULD GO TO SLEEP AND NOT WAKE UP?: NO
6. HAVE YOU EVER DONE ANYTHING, STARTED TO DO ANYTHING, OR PREPARED TO DO ANYTHING TO END YOUR LIFE?: NO
2. HAVE YOU ACTUALLY HAD ANY THOUGHTS OF KILLING YOURSELF?: NO

## 2024-10-14 NOTE — PROGRESS NOTES
Patient ID: Joaquin Talbot is a 19 y.o. male.  Referring Physician: Julio Villeda MD  [id number]  Department of Pediatrics-Hematology [place] [address]  Primary Care Provider: No primary care provider on file.    Date of Service:  10/14/2024    SUBJECTIVE:    History of Present Illness:  Joaquin is a 19 year old male with severe aplastic anemia on immunosuppressive therapy here for follow up.      Joaquin was last seen in [facility] on 8/12/24. He missed his appointment in September due to exams. Joaquin reports that he has overall been well since his last visit. At his last visit, he was weaned from 225mg ->200mg daily, which he reports general adherence to, but has missed some doses over the last week. He reports adherence with promacta and fluconazole. No recent fever, cough, rhinorrhea or nasal congestion. He reports good energy levels throughout the day. Denies any chest pain, palpitations, shortness of breath at rest or on exertion, dizziness, syncopal episodes or headaches. Denies any easy bruising or bleeding including hematuria, hematochezia, melena, gum bleeding or recent epistaxis. Denies any oral sores, throat pain or rectal pain. Joaquin plays basketball and is able to keep with his peers. He is currently taking [employer] in [employer]. Joaquin has no new concerns today.      Past Medical History: Joaquin has a past medical history of Body mass index (BMI) pediatric, 5th percentile to less than 85th percentile for age (04/30/2022), Encounter for examination for participation in sport (08/02/2021), Encounter for routine child health examination without abnormal findings (08/31/2020), Essential (primary) hypertension, Hemorrhage due to vascular prosthetic devices, implants and grafts, initial encounter (CMS-[facility]) (03/08/2022), Other conditions influencing health status (12/22/2021), Other disorders affecting eyelid function (02/03/2017), Personal history  "of other diseases of the respiratory system (12/23/2021), Personal history of other diseases of the respiratory system (12/22/2021), Personal history of other drug therapy (01/28/2022), Personal history of other specified conditions (12/22/2021), Strain of adductor muscle, fascia and tendon of unspecified thigh, initial encounter (09/15/2021), and Unspecified injury of abdomen, initial encounter (02/03/2017).    Surgical History:  Joaquin has a past surgical history that includes Other surgical history (08/04/2022) and Other surgical history (08/04/2022).    Social History:  Joaquin attends [employer]    Family History   Problem Relation Name Age of Onset    Diabetes Father         Review of Systems   Constitutional:  Negative for activity change, appetite change, fatigue, fever and unexpected weight change.   HENT:  Negative for congestion and rhinorrhea.    Eyes: Negative.  Negative for pain.   Respiratory:  Negative for cough, chest tightness and shortness of breath.    Cardiovascular: Negative.  Negative for chest pain, palpitations and leg swelling.   Gastrointestinal:  Negative for abdominal pain, blood in stool and rectal pain.   Endocrine: Negative.    Genitourinary: Negative.    Musculoskeletal:  Negative for myalgias.   Skin: Negative.  Negative for color change and rash.   Allergic/Immunologic: Positive for immunocompromised state.   Neurological: Negative.  Negative for dizziness, numbness and headaches.   Hematological: Negative.  Negative for adenopathy. Does not bruise/bleed easily.   Psychiatric/Behavioral: Negative.  Negative for behavioral problems and sleep disturbance.    All other systems reviewed and are negative.      VS:  /70 (BP Location: Right arm, Patient Position: Sitting, BP Cuff Size: Adult)   Pulse 73   Temp 36 °C (96.8 °F) (Tympanic)   Resp 18   Ht 1.812 m (5' 11.34\")   Wt 82.4 kg (181 lb 10.5 oz)   BMI 25.10 kg/m²   BSA: 2.04 meters squared    Physical Exam  Vitals and " nursing note reviewed.   Constitutional:       General: He is not in acute distress.     Appearance: Normal appearance. He is normal weight. He is not ill-appearing or toxic-appearing.   HENT:      Head: Normocephalic and atraumatic.      Nose: Nose normal.      Mouth/Throat:      Mouth: Mucous membranes are moist.      Pharynx: No oropharyngeal exudate or posterior oropharyngeal erythema.      Comments: Small healing oral sore at right buccal region  Eyes:      General: No scleral icterus.     Extraocular Movements: Extraocular movements intact.      Conjunctiva/sclera: Conjunctivae normal.      Pupils: Pupils are equal, round, and reactive to light.   Cardiovascular:      Rate and Rhythm: Normal rate and regular rhythm.      Pulses: Normal pulses.      Heart sounds: Normal heart sounds. No murmur heard.  Pulmonary:      Effort: Pulmonary effort is normal. No respiratory distress.      Breath sounds: Normal breath sounds. No rhonchi or rales.   Abdominal:      General: Abdomen is flat. Bowel sounds are normal. There is no distension.      Palpations: Abdomen is soft. There is no mass.      Tenderness: There is no abdominal tenderness. There is no guarding.      Comments: No hepatosplenomegaly   Musculoskeletal:         General: Normal range of motion.      Cervical back: Normal range of motion and neck supple.   Lymphadenopathy:      Cervical: No cervical adenopathy.   Skin:     General: Skin is warm.      Capillary Refill: Capillary refill takes less than 2 seconds.      Coloration: Skin is not pale.      Findings: No bruising or rash.   Neurological:      General: No focal deficit present.      Mental Status: He is alert and oriented to person, place, and time. Mental status is at baseline.   Psychiatric:         Mood and Affect: Mood normal.         Laboratory:   Latest Reference Range & Units 10/14/24 09:28   WBC 4.4 - 11.3 x10*3/uL 4.8   nRBC 0.0 - 0.0 /100 WBCs 0.0   RBC 4.50 - 5.90 x10*6/uL 4.27 (L)    HEMOGLOBIN 13.5 - 17.5 g/dL 12.3 (L)   HEMATOCRIT 41.0 - 52.0 % 36.7 (L)   MCV 80 - 100 fL 86   MCH 26.0 - 34.0 pg 28.8   MCHC 32.0 - 36.0 g/dL 33.5   RED CELL DISTRIBUTION WIDTH 11.5 - 14.5 % 12.3   Platelets 150 - 450 x10*3/uL 150      Latest Reference Range & Units 10/14/24 09:28   Neutrophils Absolute 1.20 - 7.70 x10*3/uL 1.72      Latest Reference Range & Units 10/14/24 09:28   Retic % 0.5 - 2.0 % 1.5      Latest Reference Range & Units 10/14/24 09:28   GLUCOSE 74 - 99 mg/dL 89   SODIUM 136 - 145 mmol/L 140   POTASSIUM 3.5 - 5.3 mmol/L 4.1   CHLORIDE 98 - 107 mmol/L 105   Bicarbonate 21 - 32 mmol/L 28   Anion Gap 10 - 20 mmol/L 11   Blood Urea Nitrogen 6 - 23 mg/dL 14   Creatinine 0.50 - 1.30 mg/dL 1.07   EGFR >60 mL/min/1.73m*2 >90   Calcium 8.6 - 10.6 mg/dL 9.3   PHOSPHORUS 2.5 - 4.9 mg/dL 4.4   Albumin 3.4 - 5.0 g/dL 4.3   Alkaline Phosphatase 33 - 120 U/L 56   ALT 10 - 52 U/L 20   AST 9 - 39 U/L 22   Bilirubin Total 0.0 - 1.2 mg/dL 0.9   Bilirubin, Direct 0.0 - 0.3 mg/dL 0.2       ASSESSMENT and PLAN:    Assessment:  Joaquin is a 19 year old male with severe aplastic anemia here for follow up. He is s/p ATG (2/23-2/26/2022) and is currently on CSA and Eltrombopag. His course was complicated by steroid induced hyperglycemia/hypertension (resolved), CSA induced gingival hyperplasia (responded to Azithomycin and gum reduction procedure by periodontist) which is also resolved now. He has responded to immunosuppressive therapy with count recovery which has since been stable. His last PRBC transfusion was on 4/1/22 and platelets were on 4/25/22 (given prior to dental procedure). He has been on Cyclosporine since March 2022, and has begun his Cyclosporine wean on 3/25/24 and is currently taking 220mg daily (200mg AM). CBC today is stable with Hb 12.3, WBC 4.8 with normal ANC 1720, platelet count 150K. Given continued stability of counts, will proceed to wean by 10% today to ~175mg daily.    Plan:  Aplastic  Anemia:  - Wean Cyclosporine by ~10%, therefore will decrease dose from 200mg daily -> 175mg daily ([facility]ill [facility])  - Continue Promacta 150mg daily  - PJP prophylaxis: Received Pentamidine today. Will continue q4 weeks  - Fungal prophylaxis: Continue Fluconazole 400mg daily    PNH:  - Last PNH profile on 2/26/24 was positive. Will repeat PNH profile annually or before if indicated.     RTC x 4 weeks for follow up, labs and further wean.     Plan discussed with patient who voiced understanding and all questions were answered  Patient was seen and discussed with Pediatric Hematologist/Oncologist, Dr. Kamran Hill MD

## 2024-10-14 NOTE — PROGRESS NOTES
Massage Therapy / Acupuncture Note:  I visited with Confucianism today.  He is doing well.  He transferred to [facility] and is enjoying it.  I will continue to check in.

## 2024-10-15 RX ORDER — CYCLOSPORINE 25 MG/1
75 CAPSULE, LIQUID FILLED ORAL DAILY
Qty: 90 CAPSULE | Refills: 1 | Status: SHIPPED | OUTPATIENT
Start: 2024-10-15 | End: 2024-12-14

## 2024-10-15 RX ORDER — ELTROMBOPAG OLAMINE 75 MG/1
150 TABLET, FILM COATED ORAL
Qty: 60 TABLET | Refills: 3 | Status: SHIPPED | OUTPATIENT
Start: 2024-10-15

## 2024-10-15 RX ORDER — FLUCONAZOLE 200 MG/1
400 TABLET ORAL DAILY
Qty: 28 TABLET | Refills: 3 | Status: SHIPPED | OUTPATIENT
Start: 2024-10-15 | End: 2024-12-10

## 2024-10-15 RX ORDER — CYCLOSPORINE 100 MG/1
100 CAPSULE, LIQUID FILLED ORAL DAILY
Qty: 30 CAPSULE | Refills: 1 | Status: SHIPPED | OUTPATIENT
Start: 2024-10-15 | End: 2024-12-14

## 2024-11-01 DIAGNOSIS — D61.9 APLASTIC ANEMIA (MULTI): Primary | ICD-10-CM

## 2024-11-11 ENCOUNTER — HOSPITAL ENCOUNTER (OUTPATIENT)
Dept: PEDIATRIC HEMATOLOGY/ONCOLOGY | Facility: HOSPITAL | Age: 19
Discharge: HOME | End: 2024-11-11
Payer: COMMERCIAL

## 2024-11-11 VITALS
RESPIRATION RATE: 18 BRPM | HEIGHT: 71 IN | BODY MASS INDEX: 24.35 KG/M2 | HEART RATE: 58 BPM | DIASTOLIC BLOOD PRESSURE: 71 MMHG | TEMPERATURE: 97.3 F | SYSTOLIC BLOOD PRESSURE: 121 MMHG | WEIGHT: 173.94 LBS

## 2024-11-11 DIAGNOSIS — D61.9 APLASTIC ANEMIA (MULTI): Primary | ICD-10-CM

## 2024-11-11 LAB
ALBUMIN SERPL BCP-MCNC: 5 G/DL (ref 3.4–5)
ALP SERPL-CCNC: 66 U/L (ref 33–120)
ALT SERPL W P-5'-P-CCNC: 23 U/L (ref 10–52)
ANION GAP SERPL CALC-SCNC: 12 MMOL/L (ref 10–20)
AST SERPL W P-5'-P-CCNC: 21 U/L (ref 9–39)
BASOPHILS # BLD AUTO: 0.01 X10*3/UL (ref 0–0.1)
BASOPHILS NFR BLD AUTO: 0.2 %
BILIRUB DIRECT SERPL-MCNC: 0.4 MG/DL (ref 0–0.3)
BILIRUB SERPL-MCNC: 2.3 MG/DL (ref 0–1.2)
BUN SERPL-MCNC: 19 MG/DL (ref 6–23)
CALCIUM SERPL-MCNC: 10.5 MG/DL (ref 8.6–10.6)
CHLORIDE SERPL-SCNC: 101 MMOL/L (ref 98–107)
CO2 SERPL-SCNC: 30 MMOL/L (ref 21–32)
CREAT SERPL-MCNC: 1.26 MG/DL (ref 0.5–1.3)
CYCLOSPORINE BLD IA-MCNC: 33 NG/ML (ref 80–210)
EGFRCR SERPLBLD CKD-EPI 2021: 84 ML/MIN/1.73M*2
EOSINOPHIL # BLD AUTO: 0.05 X10*3/UL (ref 0–0.7)
EOSINOPHIL NFR BLD AUTO: 1 %
ERYTHROCYTE [DISTWIDTH] IN BLOOD BY AUTOMATED COUNT: 12.3 % (ref 11.5–14.5)
GLUCOSE SERPL-MCNC: 88 MG/DL (ref 74–99)
HCT VFR BLD AUTO: 42.1 % (ref 41–52)
HGB BLD-MCNC: 14.2 G/DL (ref 13.5–17.5)
HGB RETIC QN: 32 PG (ref 28–38)
IMM GRANULOCYTES # BLD AUTO: 0.01 X10*3/UL (ref 0–0.7)
IMM GRANULOCYTES NFR BLD AUTO: 0.2 % (ref 0–0.9)
IMMATURE RETIC FRACTION: 10.2 %
LYMPHOCYTES # BLD AUTO: 1.78 X10*3/UL (ref 1.2–4.8)
LYMPHOCYTES NFR BLD AUTO: 34.1 %
MCH RBC QN AUTO: 29 PG (ref 26–34)
MCHC RBC AUTO-ENTMCNC: 33.7 G/DL (ref 32–36)
MCV RBC AUTO: 86 FL (ref 80–100)
MONOCYTES # BLD AUTO: 0.42 X10*3/UL (ref 0.1–1)
MONOCYTES NFR BLD AUTO: 8 %
NEUTROPHILS # BLD AUTO: 2.95 X10*3/UL (ref 1.2–7.7)
NEUTROPHILS NFR BLD AUTO: 56.5 %
NRBC BLD-RTO: 0 /100 WBCS (ref 0–0)
PHOSPHATE SERPL-MCNC: 3.5 MG/DL (ref 2.5–4.9)
PLATELET # BLD AUTO: 184 X10*3/UL (ref 150–450)
POTASSIUM SERPL-SCNC: 4.5 MMOL/L (ref 3.5–5.3)
PROT SERPL-MCNC: 8.4 G/DL (ref 6.4–8.2)
RBC # BLD AUTO: 4.9 X10*6/UL (ref 4.5–5.9)
RETICS #: 0.08 X10*6/UL (ref 0.02–0.12)
RETICS/RBC NFR AUTO: 1.6 % (ref 0.5–2)
SODIUM SERPL-SCNC: 138 MMOL/L (ref 136–145)
WBC # BLD AUTO: 5.2 X10*3/UL (ref 4.4–11.3)

## 2024-11-11 PROCEDURE — 2500000004 HC RX 250 GENERAL PHARMACY W/ HCPCS (ALT 636 FOR OP/ED): Performed by: STUDENT IN AN ORGANIZED HEALTH CARE EDUCATION/TRAINING PROGRAM

## 2024-11-11 PROCEDURE — 94642 AEROSOL INHALATION TREATMENT: CPT | Performed by: STUDENT IN AN ORGANIZED HEALTH CARE EDUCATION/TRAINING PROGRAM

## 2024-11-11 PROCEDURE — 85025 COMPLETE CBC W/AUTO DIFF WBC: CPT | Performed by: STUDENT IN AN ORGANIZED HEALTH CARE EDUCATION/TRAINING PROGRAM

## 2024-11-11 PROCEDURE — 2500000002 HC RX 250 W HCPCS SELF ADMINISTERED DRUGS (ALT 637 FOR MEDICARE OP, ALT 636 FOR OP/ED): Performed by: STUDENT IN AN ORGANIZED HEALTH CARE EDUCATION/TRAINING PROGRAM

## 2024-11-11 PROCEDURE — 36415 COLL VENOUS BLD VENIPUNCTURE: CPT | Performed by: STUDENT IN AN ORGANIZED HEALTH CARE EDUCATION/TRAINING PROGRAM

## 2024-11-11 PROCEDURE — 99215 OFFICE O/P EST HI 40 MIN: CPT | Performed by: STUDENT IN AN ORGANIZED HEALTH CARE EDUCATION/TRAINING PROGRAM

## 2024-11-11 PROCEDURE — 84075 ASSAY ALKALINE PHOSPHATASE: CPT | Performed by: STUDENT IN AN ORGANIZED HEALTH CARE EDUCATION/TRAINING PROGRAM

## 2024-11-11 PROCEDURE — 85045 AUTOMATED RETICULOCYTE COUNT: CPT | Performed by: STUDENT IN AN ORGANIZED HEALTH CARE EDUCATION/TRAINING PROGRAM

## 2024-11-11 PROCEDURE — 84100 ASSAY OF PHOSPHORUS: CPT | Performed by: STUDENT IN AN ORGANIZED HEALTH CARE EDUCATION/TRAINING PROGRAM

## 2024-11-11 PROCEDURE — 80158 DRUG ASSAY CYCLOSPORINE: CPT | Performed by: STUDENT IN AN ORGANIZED HEALTH CARE EDUCATION/TRAINING PROGRAM

## 2024-11-11 RX ORDER — ALBUTEROL SULFATE 0.83 MG/ML
5 SOLUTION RESPIRATORY (INHALATION) ONCE
Status: COMPLETED | OUTPATIENT
Start: 2024-11-11 | End: 2024-11-11

## 2024-11-11 RX ORDER — PENTAMIDINE ISETHIONATE 300 MG/300MG
300 INHALANT RESPIRATORY (INHALATION) ONCE
Status: COMPLETED | OUTPATIENT
Start: 2024-11-11 | End: 2024-11-11

## 2024-11-11 RX ORDER — DIPHENHYDRAMINE HYDROCHLORIDE 50 MG/ML
50 INJECTION INTRAMUSCULAR; INTRAVENOUS ONCE AS NEEDED
OUTPATIENT
Start: 2024-12-09

## 2024-11-11 RX ORDER — ALBUTEROL SULFATE 0.83 MG/ML
2.5 SOLUTION RESPIRATORY (INHALATION) ONCE AS NEEDED
OUTPATIENT
Start: 2024-12-09

## 2024-11-11 RX ORDER — EPINEPHRINE 1 MG/ML
0.01 INJECTION, SOLUTION, CONCENTRATE INTRAVENOUS ONCE AS NEEDED
OUTPATIENT
Start: 2024-12-09

## 2024-11-11 RX ORDER — PENTAMIDINE ISETHIONATE 300 MG/300MG
300 INHALANT RESPIRATORY (INHALATION) ONCE
OUTPATIENT
Start: 2024-12-09 | End: 2024-12-09

## 2024-11-11 RX ORDER — ALBUTEROL SULFATE 0.83 MG/ML
5 SOLUTION RESPIRATORY (INHALATION) ONCE
OUTPATIENT
Start: 2024-12-09 | End: 2024-12-09

## 2024-11-11 ASSESSMENT — ENCOUNTER SYMPTOMS
RECTAL PAIN: 0
EYES NEGATIVE: 1
ABDOMINAL PAIN: 0
FATIGUE: 0
BRUISES/BLEEDS EASILY: 0
APPETITE CHANGE: 0
OCCASIONAL FEELINGS OF UNSTEADINESS: 0
NUMBNESS: 0
ADENOPATHY: 0
PSYCHIATRIC NEGATIVE: 1
CHEST TIGHTNESS: 0
PALPITATIONS: 0
BLOOD IN STOOL: 0
EYE PAIN: 0
NEUROLOGICAL NEGATIVE: 1
HEADACHES: 0
FEVER: 0
LOSS OF SENSATION IN FEET: 0
RHINORRHEA: 0
SHORTNESS OF BREATH: 0
DEPRESSION: 0
CARDIOVASCULAR NEGATIVE: 1
UNEXPECTED WEIGHT CHANGE: 0
HEMATOLOGIC/LYMPHATIC NEGATIVE: 1
DIZZINESS: 0
ENDOCRINE NEGATIVE: 1
COLOR CHANGE: 0
COUGH: 0
MYALGIAS: 0
ACTIVITY CHANGE: 0
SLEEP DISTURBANCE: 0

## 2024-11-11 ASSESSMENT — PATIENT HEALTH QUESTIONNAIRE - PHQ9
SUM OF ALL RESPONSES TO PHQ9 QUESTIONS 1 AND 2: 0
1. LITTLE INTEREST OR PLEASURE IN DOING THINGS: NOT AT ALL
2. FEELING DOWN, DEPRESSED OR HOPELESS: NOT AT ALL

## 2024-11-11 ASSESSMENT — PAIN SCALES - GENERAL: PAINLEVEL_OUTOF10: 0-NO PAIN

## 2024-11-11 NOTE — PROGRESS NOTES
Patient ID: Joaquin Talbot is a 19 y.o. male.  Referring Physician: Judith Hill MD  [id number] [place] [address]  Primary Care Provider: No primary care provider on file.    Date of Service:  11/11/2024    SUBJECTIVE:    History of Present Illness:  Joaquin is a 19 year old male with severe aplastic anemia on immunosuppressive therapy here for follow up.      Joaquin was last seen in [facility] on 10/14/24. At his last visit, he was weaned from 200mg ->175mg daily, which he began taking ~2-3 weeks ago due to delay in getting his new prescription from [facility]. He reports adherence to Cyclosporine over the last few weeks and has not missed any doses. He also reports adherence with promacta and fluconazole. No recent fever, cough, rhinorrhea or nasal congestion. He reports good energy levels throughout the day. Denies any chest pain, palpitations, shortness of breath at rest or on exertion, dizziness, syncopal episodes or headaches. Denies any easy bruising or bleeding including hematuria, hematochezia, melena, or recent epistaxis. Denies any oral sores, throat pain or rectal pain. Joaquin plays basketball and is able to keep with his peers. Joaquin has no new concerns today.        Past Medical History: Joaquin has a past medical history of Body mass index (BMI) pediatric, 5th percentile to less than 85th percentile for age (04/30/2022), Encounter for examination for participation in sport (08/02/2021), Encounter for routine child health examination without abnormal findings (08/31/2020), Essential (primary) hypertension, Hemorrhage due to vascular prosthetic devices, implants and grafts, initial encounter (CMS-HCC) (03/08/2022), Other conditions influencing health status (12/22/2021), Other disorders affecting eyelid function (02/03/2017), Personal history of other diseases of the respiratory system (12/23/2021), Personal history of other diseases of the respiratory system (12/22/2021),  "Personal history of other drug therapy (01/28/2022), Personal history of other specified conditions (12/22/2021), Strain of adductor muscle, fascia and tendon of unspecified thigh, initial encounter (09/15/2021), and Unspecified injury of abdomen, initial encounter (02/03/2017).    Surgical History:  Joaquin has a past surgical history that includes Other surgical history (08/04/2022) and Other surgical history (08/04/2022).    Social History:  Joaquin attends [employer]    Family History   Problem Relation Name Age of Onset    Diabetes Father         Review of Systems   Constitutional:  Negative for activity change, appetite change, fatigue, fever and unexpected weight change.   HENT:  Negative for congestion and rhinorrhea.    Eyes: Negative.  Negative for pain.   Respiratory:  Negative for cough, chest tightness and shortness of breath.    Cardiovascular: Negative.  Negative for chest pain, palpitations and leg swelling.   Gastrointestinal:  Negative for abdominal pain, blood in stool and rectal pain.   Endocrine: Negative.    Genitourinary: Negative.    Musculoskeletal:  Negative for myalgias.   Skin: Negative.  Negative for color change and rash.   Allergic/Immunologic: Positive for immunocompromised state.   Neurological: Negative.  Negative for dizziness, numbness and headaches.   Hematological: Negative.  Negative for adenopathy. Does not bruise/bleed easily.   Psychiatric/Behavioral: Negative.  Negative for behavioral problems and sleep disturbance.    All other systems reviewed and are negative.      VS:  /71 (BP Location: Right arm, Patient Position: Sitting, BP Cuff Size: Adult)   Pulse 58   Temp 36.3 °C (97.3 °F) (Tympanic)   Resp 18   Ht 1.81 m (5' 11.26\")   Wt 78.9 kg (173 lb 15.1 oz)   BMI 24.08 kg/m²   BSA: 1.99 meters squared    Physical Exam  Vitals and nursing note reviewed.   Constitutional:       General: He is not in acute distress.     Appearance: Normal appearance. He is normal " weight. He is not ill-appearing or toxic-appearing.   HENT:      Head: Normocephalic and atraumatic.      Nose: Nose normal.      Mouth/Throat:      Mouth: Mucous membranes are moist.      Pharynx: No oropharyngeal exudate or posterior oropharyngeal erythema.   Eyes:      General: No scleral icterus.     Extraocular Movements: Extraocular movements intact.      Conjunctiva/sclera: Conjunctivae normal.      Pupils: Pupils are equal, round, and reactive to light.   Cardiovascular:      Rate and Rhythm: Normal rate and regular rhythm.      Pulses: Normal pulses.      Heart sounds: Normal heart sounds. No murmur heard.  Pulmonary:      Effort: Pulmonary effort is normal. No respiratory distress.      Breath sounds: Normal breath sounds. No rhonchi or rales.   Abdominal:      General: Abdomen is flat. Bowel sounds are normal. There is no distension.      Palpations: Abdomen is soft. There is no mass.      Tenderness: There is no abdominal tenderness. There is no guarding.      Comments: No hepatosplenomegaly   Musculoskeletal:         General: Normal range of motion.      Cervical back: Normal range of motion and neck supple.   Lymphadenopathy:      Cervical: No cervical adenopathy.   Skin:     General: Skin is warm.      Capillary Refill: Capillary refill takes less than 2 seconds.      Coloration: Skin is not pale.      Findings: No bruising or rash.   Neurological:      General: No focal deficit present.      Mental Status: He is alert and oriented to person, place, and time. Mental status is at baseline.   Psychiatric:         Mood and Affect: Mood normal.       Laboratory:      ASSESSMENT and PLAN:    Assessment:  Joaquin is a 19 year old male with severe aplastic anemia here for follow up. He is s/p ATG (2/23-2/26/2022) and is currently on CSA and Eltrombopag. His course was complicated by steroid induced hyperglycemia/hypertension (resolved), CSA induced gingival hyperplasia (responded to Azithomycin and gum  reduction procedure by periodontist) which is also resolved now. He has responded to immunosuppressive therapy with count recovery which has since been stable. His last PRBC transfusion was on 4/1/22 and platelets were on 4/25/22 (given prior to dental procedure). He has been on Cyclosporine since March 2022, and has begun his Cyclosporine wean on 3/25/24 and is currently taking 175mg daily. CBC today is stable with Hb 14.2, WBC 5.2 with normal ANC 2950, platelet count 184K. Given continued stability of counts, will proceed to wean by 10% today to ~150mg daily.    Plan:  Aplastic Anemia:  - Wean Cyclosporine by ~10%, therefore will decrease dose from 175mg daily -> 150mg daily ([facility])  - Continue Promacta 150mg daily  - PJP prophylaxis: Received Pentamidine today. Will continue q4 weeks  - Fungal prophylaxis: Continue Fluconazole 400mg daily    PNH:  - Last PNH profile on 2/26/24 was positive. Will repeat PNH profile annually or before if indicated.     RTC x 4 weeks for follow up, labs and further wean.     Plan discussed with patient who voiced understanding and all questions were answered  Patient was seen and discussed with Pediatric Hematologist/Oncologist, Dr. Kamran Hill MD

## 2024-11-13 RX ORDER — CYCLOSPORINE 100 MG/1
100 CAPSULE, LIQUID FILLED ORAL DAILY
Qty: 30 CAPSULE | Refills: 1 | Status: SHIPPED | OUTPATIENT
Start: 2024-11-13 | End: 2025-01-12

## 2024-11-13 RX ORDER — CYCLOSPORINE 25 MG/1
50 CAPSULE, LIQUID FILLED ORAL DAILY
Qty: 60 CAPSULE | Refills: 1 | Status: SHIPPED | OUTPATIENT
Start: 2024-11-13 | End: 2025-01-12

## 2024-11-13 RX ORDER — CYCLOSPORINE 25 MG/1
50 CAPSULE, LIQUID FILLED ORAL DAILY
Qty: 60 CAPSULE | Refills: 1 | Status: SHIPPED | OUTPATIENT
Start: 2024-11-13 | End: 2024-11-13 | Stop reason: SDUPTHER

## 2024-11-13 RX ORDER — CYCLOSPORINE 100 MG/1
100 CAPSULE, LIQUID FILLED ORAL DAILY
Qty: 30 CAPSULE | Refills: 1 | Status: SHIPPED | OUTPATIENT
Start: 2024-11-13 | End: 2024-11-13 | Stop reason: SDUPTHER

## 2024-12-09 ENCOUNTER — APPOINTMENT (OUTPATIENT)
Dept: PEDIATRIC HEMATOLOGY/ONCOLOGY | Facility: HOSPITAL | Age: 19
End: 2024-12-09
Payer: COMMERCIAL

## 2024-12-16 ENCOUNTER — APPOINTMENT (OUTPATIENT)
Dept: PEDIATRIC HEMATOLOGY/ONCOLOGY | Facility: HOSPITAL | Age: 19
End: 2024-12-16
Payer: COMMERCIAL

## 2025-02-03 ENCOUNTER — HOSPITAL ENCOUNTER (OUTPATIENT)
Dept: PEDIATRIC HEMATOLOGY/ONCOLOGY | Facility: HOSPITAL | Age: 20
Discharge: HOME | End: 2025-02-03
Payer: COMMERCIAL

## 2025-02-03 VITALS
RESPIRATION RATE: 20 BRPM | TEMPERATURE: 98.2 F | HEIGHT: 71 IN | BODY MASS INDEX: 25.4 KG/M2 | OXYGEN SATURATION: 100 % | DIASTOLIC BLOOD PRESSURE: 75 MMHG | SYSTOLIC BLOOD PRESSURE: 128 MMHG | WEIGHT: 181.44 LBS | HEART RATE: 66 BPM

## 2025-02-03 DIAGNOSIS — D84.9 IMMUNOCOMPROMISED: ICD-10-CM

## 2025-02-03 DIAGNOSIS — Z79.621: ICD-10-CM

## 2025-02-03 DIAGNOSIS — D61.9 APLASTIC ANEMIA (MULTI): ICD-10-CM

## 2025-02-03 LAB
ALBUMIN SERPL BCP-MCNC: 4.8 G/DL (ref 3.4–5)
ALP SERPL-CCNC: 60 U/L (ref 33–120)
ALT SERPL W P-5'-P-CCNC: 16 U/L (ref 10–52)
ANION GAP SERPL CALC-SCNC: 11 MMOL/L (ref 10–20)
AST SERPL W P-5'-P-CCNC: 19 U/L (ref 9–39)
BASOPHILS # BLD AUTO: 0.01 X10*3/UL (ref 0–0.1)
BASOPHILS NFR BLD AUTO: 0.2 %
BILIRUB DIRECT SERPL-MCNC: 0.3 MG/DL (ref 0–0.3)
BILIRUB SERPL-MCNC: 1.5 MG/DL (ref 0–1.2)
BUN SERPL-MCNC: 12 MG/DL (ref 6–23)
CALCIUM SERPL-MCNC: 9.9 MG/DL (ref 8.6–10.6)
CHLORIDE SERPL-SCNC: 102 MMOL/L (ref 98–107)
CO2 SERPL-SCNC: 26 MMOL/L (ref 21–32)
CREAT SERPL-MCNC: 1.01 MG/DL (ref 0.5–1.3)
CYCLOSPORINE BLD IA-MCNC: 211 NG/ML (ref 80–210)
EGFRCR SERPLBLD CKD-EPI 2021: >90 ML/MIN/1.73M*2
EOSINOPHIL # BLD AUTO: 0.05 X10*3/UL (ref 0–0.7)
EOSINOPHIL NFR BLD AUTO: 1.1 %
ERYTHROCYTE [DISTWIDTH] IN BLOOD BY AUTOMATED COUNT: 12 % (ref 11.5–14.5)
GLUCOSE SERPL-MCNC: 92 MG/DL (ref 74–99)
HCT VFR BLD AUTO: 39.3 % (ref 41–52)
HGB BLD-MCNC: 13.4 G/DL (ref 13.5–17.5)
HGB RETIC QN: 33 PG (ref 28–38)
IMM GRANULOCYTES # BLD AUTO: 0.01 X10*3/UL (ref 0–0.7)
IMM GRANULOCYTES NFR BLD AUTO: 0.2 % (ref 0–0.9)
IMMATURE RETIC FRACTION: 10.2 %
LYMPHOCYTES # BLD AUTO: 2.19 X10*3/UL (ref 1.2–4.8)
LYMPHOCYTES NFR BLD AUTO: 46.2 %
MCH RBC QN AUTO: 28.9 PG (ref 26–34)
MCHC RBC AUTO-ENTMCNC: 34.1 G/DL (ref 32–36)
MCV RBC AUTO: 85 FL (ref 80–100)
MONOCYTES # BLD AUTO: 0.52 X10*3/UL (ref 0.1–1)
MONOCYTES NFR BLD AUTO: 11 %
NEUTROPHILS # BLD AUTO: 1.96 X10*3/UL (ref 1.2–7.7)
NEUTROPHILS NFR BLD AUTO: 41.3 %
NRBC BLD-RTO: 0 /100 WBCS (ref 0–0)
PHOSPHATE SERPL-MCNC: 3.4 MG/DL (ref 2.5–4.9)
PLATELET # BLD AUTO: 151 X10*3/UL (ref 150–450)
POTASSIUM SERPL-SCNC: 4.1 MMOL/L (ref 3.5–5.3)
PROT SERPL-MCNC: 7.8 G/DL (ref 6.4–8.2)
RBC # BLD AUTO: 4.63 X10*6/UL (ref 4.5–5.9)
RETICS #: 0.07 X10*6/UL (ref 0.02–0.12)
RETICS/RBC NFR AUTO: 1.6 % (ref 0.5–2)
SODIUM SERPL-SCNC: 135 MMOL/L (ref 136–145)
WBC # BLD AUTO: 4.7 X10*3/UL (ref 4.4–11.3)

## 2025-02-03 PROCEDURE — 99215 OFFICE O/P EST HI 40 MIN: CPT | Performed by: STUDENT IN AN ORGANIZED HEALTH CARE EDUCATION/TRAINING PROGRAM

## 2025-02-03 PROCEDURE — 80053 COMPREHEN METABOLIC PANEL: CPT | Performed by: STUDENT IN AN ORGANIZED HEALTH CARE EDUCATION/TRAINING PROGRAM

## 2025-02-03 PROCEDURE — 82248 BILIRUBIN DIRECT: CPT | Performed by: STUDENT IN AN ORGANIZED HEALTH CARE EDUCATION/TRAINING PROGRAM

## 2025-02-03 PROCEDURE — 2500000004 HC RX 250 GENERAL PHARMACY W/ HCPCS (ALT 636 FOR OP/ED): Performed by: STUDENT IN AN ORGANIZED HEALTH CARE EDUCATION/TRAINING PROGRAM

## 2025-02-03 PROCEDURE — 36415 COLL VENOUS BLD VENIPUNCTURE: CPT | Performed by: STUDENT IN AN ORGANIZED HEALTH CARE EDUCATION/TRAINING PROGRAM

## 2025-02-03 PROCEDURE — 2500000002 HC RX 250 W HCPCS SELF ADMINISTERED DRUGS (ALT 637 FOR MEDICARE OP, ALT 636 FOR OP/ED): Performed by: STUDENT IN AN ORGANIZED HEALTH CARE EDUCATION/TRAINING PROGRAM

## 2025-02-03 PROCEDURE — 80158 DRUG ASSAY CYCLOSPORINE: CPT | Performed by: STUDENT IN AN ORGANIZED HEALTH CARE EDUCATION/TRAINING PROGRAM

## 2025-02-03 PROCEDURE — 85025 COMPLETE CBC W/AUTO DIFF WBC: CPT | Performed by: STUDENT IN AN ORGANIZED HEALTH CARE EDUCATION/TRAINING PROGRAM

## 2025-02-03 PROCEDURE — 94642 AEROSOL INHALATION TREATMENT: CPT | Performed by: STUDENT IN AN ORGANIZED HEALTH CARE EDUCATION/TRAINING PROGRAM

## 2025-02-03 PROCEDURE — 84100 ASSAY OF PHOSPHORUS: CPT | Performed by: STUDENT IN AN ORGANIZED HEALTH CARE EDUCATION/TRAINING PROGRAM

## 2025-02-03 PROCEDURE — 85045 AUTOMATED RETICULOCYTE COUNT: CPT | Performed by: STUDENT IN AN ORGANIZED HEALTH CARE EDUCATION/TRAINING PROGRAM

## 2025-02-03 RX ORDER — PENTAMIDINE ISETHIONATE 300 MG/300MG
300 INHALANT RESPIRATORY (INHALATION) ONCE
OUTPATIENT
Start: 2025-03-03 | End: 2025-03-03

## 2025-02-03 RX ORDER — EPINEPHRINE 1 MG/ML
0.01 INJECTION, SOLUTION, CONCENTRATE INTRAVENOUS ONCE AS NEEDED
OUTPATIENT
Start: 2025-03-03

## 2025-02-03 RX ORDER — DIPHENHYDRAMINE HYDROCHLORIDE 50 MG/ML
50 INJECTION INTRAMUSCULAR; INTRAVENOUS ONCE AS NEEDED
OUTPATIENT
Start: 2025-03-03

## 2025-02-03 RX ORDER — ALBUTEROL SULFATE 0.83 MG/ML
5 SOLUTION RESPIRATORY (INHALATION) ONCE
OUTPATIENT
Start: 2025-03-03 | End: 2025-03-03

## 2025-02-03 RX ORDER — ALBUTEROL SULFATE 0.83 MG/ML
2.5 SOLUTION RESPIRATORY (INHALATION) ONCE AS NEEDED
OUTPATIENT
Start: 2025-03-03

## 2025-02-03 RX ORDER — PENTAMIDINE ISETHIONATE 300 MG/300MG
300 INHALANT RESPIRATORY (INHALATION) ONCE
Status: COMPLETED | OUTPATIENT
Start: 2025-02-03 | End: 2025-02-03

## 2025-02-03 RX ORDER — ALBUTEROL SULFATE 0.83 MG/ML
5 SOLUTION RESPIRATORY (INHALATION) ONCE
Status: COMPLETED | OUTPATIENT
Start: 2025-02-03 | End: 2025-02-03

## 2025-02-03 RX ADMIN — ALBUTEROL SULFATE 5 MG: 2.5 SOLUTION RESPIRATORY (INHALATION) at 11:49

## 2025-02-03 RX ADMIN — PENTAMIDINE ISETHIONATE 300 MG: 300 INHALANT RESPIRATORY (INHALATION) at 12:10

## 2025-02-03 ASSESSMENT — ENCOUNTER SYMPTOMS
ENDOCRINE NEGATIVE: 1
CARDIOVASCULAR NEGATIVE: 1
ACTIVITY CHANGE: 0
RECTAL PAIN: 0
HEADACHES: 0
BRUISES/BLEEDS EASILY: 0
COLOR CHANGE: 0
FEVER: 0
ABDOMINAL PAIN: 0
EYE PAIN: 0
BLOOD IN STOOL: 0
UNEXPECTED WEIGHT CHANGE: 0
DEPRESSION: 0
EYES NEGATIVE: 1
CHEST TIGHTNESS: 0
NUMBNESS: 0
FATIGUE: 0
COUGH: 0
LOSS OF SENSATION IN FEET: 0
HEMATOLOGIC/LYMPHATIC NEGATIVE: 1
PSYCHIATRIC NEGATIVE: 1
SLEEP DISTURBANCE: 0
NEUROLOGICAL NEGATIVE: 1
DIZZINESS: 0
MYALGIAS: 0
ADENOPATHY: 0
RHINORRHEA: 0
APPETITE CHANGE: 0
SHORTNESS OF BREATH: 0
OCCASIONAL FEELINGS OF UNSTEADINESS: 0
PALPITATIONS: 0

## 2025-02-03 ASSESSMENT — PAIN SCALES - GENERAL: PAINLEVEL_OUTOF10: 0-NO PAIN

## 2025-02-03 NOTE — PROGRESS NOTES
Patient ID: Joaquin Talbot is a 19 y.o. male.  Referring Physician: Judith Hill MD  [id number] [place] [address]  Primary Care Provider: No primary care provider on file.    Date of Service:  2/3/2025    SUBJECTIVE:    History of Present Illness:  Joaquin is a 19 year old male with severe aplastic anemia on immunosuppressive therapy here for follow up.      Joaquin was last seen in [facility] clinic on 11/11/24 and has since missed multiple appointments. At his last visit, he was weaned from 175mg ->150mg daily, which he mostly reports adherence to, and has missed 1-2 doses. He also reports adherence with promacta and fluconazole. No recent fever, cough, rhinorrhea or nasal congestion. He reports good energy levels throughout the day. Denies any chest pain, palpitations, shortness of breath at rest or on exertion, dizziness, syncopal episodes or headaches. Denies any easy bruising or bleeding including hematuria, hematochezia, melena, or recent epistaxis. Denies any oral sores, throat pain or rectal pain. Joaquin has no new concerns today.        Past Medical History: Joaquin has a past medical history of Body mass index (BMI) pediatric, 5th percentile to less than 85th percentile for age (04/30/2022), Encounter for examination for participation in sport (08/02/2021), Encounter for routine child health examination without abnormal findings (08/31/2020), Essential (primary) hypertension, Hemorrhage due to vascular prosthetic devices, implants and grafts, initial encounter (CMS-HCC) (03/08/2022), Other conditions influencing health status (12/22/2021), Other disorders affecting eyelid function (02/03/2017), Personal history of other diseases of the respiratory system (12/23/2021), Personal history of other diseases of the respiratory system (12/22/2021), Personal history of other drug therapy (01/28/2022), Personal history of other specified conditions (12/22/2021), Strain of adductor muscle,  "fascia and tendon of unspecified thigh, initial encounter (09/15/2021), and Unspecified injury of abdomen, initial encounter (02/03/2017).    Surgical History:  Joaquin has a past surgical history that includes Other surgical history (08/04/2022) and Other surgical history (08/04/2022).    Social History:  Joaquin attends [employer]    Family History   Problem Relation Name Age of Onset    Diabetes Father         Review of Systems   Constitutional:  Negative for activity change, appetite change, fatigue, fever and unexpected weight change.   HENT:  Negative for congestion and rhinorrhea.    Eyes: Negative.  Negative for pain.   Respiratory:  Negative for cough, chest tightness and shortness of breath.    Cardiovascular: Negative.  Negative for chest pain, palpitations and leg swelling.   Gastrointestinal:  Negative for abdominal pain, blood in stool and rectal pain.   Endocrine: Negative.    Genitourinary: Negative.    Musculoskeletal:  Negative for myalgias.   Skin: Negative.  Negative for color change and rash.   Allergic/Immunologic: Positive for immunocompromised state.   Neurological: Negative.  Negative for dizziness, numbness and headaches.   Hematological: Negative.  Negative for adenopathy. Does not bruise/bleed easily.   Psychiatric/Behavioral: Negative.  Negative for behavioral problems and sleep disturbance.    All other systems reviewed and are negative.      VS:  /75 (BP Location: Right arm, Patient Position: Sitting, BP Cuff Size: Adult)   Pulse 66   Temp 36.8 °C (98.2 °F) (Tympanic)   Resp 20   Ht 1.813 m (5' 11.38\")   Wt 82.3 kg (181 lb 7 oz)   SpO2 100%   BMI 25.04 kg/m²   BSA: 2.04 meters squared    Physical Exam  Vitals and nursing note reviewed.   Constitutional:       General: He is not in acute distress.     Appearance: Normal appearance. He is normal weight. He is not ill-appearing or toxic-appearing.   HENT:      Head: Normocephalic and atraumatic.      Nose: Nose normal.      " Mouth/Throat:      Mouth: Mucous membranes are moist.      Pharynx: No oropharyngeal exudate or posterior oropharyngeal erythema.   Eyes:      General: No scleral icterus.     Extraocular Movements: Extraocular movements intact.      Conjunctiva/sclera: Conjunctivae normal.      Pupils: Pupils are equal, round, and reactive to light.   Cardiovascular:      Rate and Rhythm: Normal rate and regular rhythm.      Pulses: Normal pulses.      Heart sounds: Normal heart sounds. No murmur heard.  Pulmonary:      Effort: Pulmonary effort is normal. No respiratory distress.      Breath sounds: Normal breath sounds. No rhonchi or rales.   Abdominal:      General: Abdomen is flat. Bowel sounds are normal. There is no distension.      Palpations: Abdomen is soft. There is no mass.      Tenderness: There is no abdominal tenderness. There is no guarding.      Comments: No hepatosplenomegaly   Musculoskeletal:         General: Normal range of motion.      Cervical back: Normal range of motion and neck supple.   Lymphadenopathy:      Cervical: No cervical adenopathy.   Skin:     General: Skin is warm.      Capillary Refill: Capillary refill takes less than 2 seconds.      Coloration: Skin is not pale.      Findings: No bruising or rash.   Neurological:      General: No focal deficit present.      Mental Status: He is alert and oriented to person, place, and time. Mental status is at baseline.   Psychiatric:         Mood and Affect: Mood normal.       Laboratory:   Latest Reference Range & Units 02/03/25 10:48   WBC 4.4 - 11.3 x10*3/uL 4.7   nRBC 0.0 - 0.0 /100 WBCs 0.0   RBC 4.50 - 5.90 x10*6/uL 4.63   HEMOGLOBIN 13.5 - 17.5 g/dL 13.4 (L)   HEMATOCRIT 41.0 - 52.0 % 39.3 (L)   MCV 80 - 100 fL 85   MCH 26.0 - 34.0 pg 28.9   MCHC 32.0 - 36.0 g/dL 34.1   RED CELL DISTRIBUTION WIDTH 11.5 - 14.5 % 12.0   Platelets 150 - 450 x10*3/uL 151   Neutrophils % 40.0 - 80.0 % 41.3   Immature Granulocytes %, Automated 0.0 - 0.9 % 0.2   Lymphocytes %  13.0 - 44.0 % 46.2   Monocytes % 2.0 - 10.0 % 11.0   Eosinophils % 0.0 - 6.0 % 1.1   Basophils % 0.0 - 2.0 % 0.2   Neutrophils Absolute 1.20 - 7.70 x10*3/uL 1.96   Immature Granulocytes Absolute, Automated 0.00 - 0.70 x10*3/uL 0.01   Lymphocytes Absolute 1.20 - 4.80 x10*3/uL 2.19   Monocytes Absolute 0.10 - 1.00 x10*3/uL 0.52   Eosinophils Absolute 0.00 - 0.70 x10*3/uL 0.05   Basophils Absolute 0.00 - 0.10 x10*3/uL 0.01      Latest Reference Range & Units 02/03/25 10:48   Retic % 0.5 - 2.0 % 1.6      Latest Reference Range & Units 11/11/24 10:15   GLUCOSE 74 - 99 mg/dL 88   SODIUM 136 - 145 mmol/L 138   POTASSIUM 3.5 - 5.3 mmol/L 4.5   CHLORIDE 98 - 107 mmol/L 101   Bicarbonate 21 - 32 mmol/L 30   Anion Gap 10 - 20 mmol/L 12   Blood Urea Nitrogen 6 - 23 mg/dL 19   Creatinine 0.50 - 1.30 mg/dL 1.26   EGFR >60 mL/min/1.73m*2 84   Calcium 8.6 - 10.6 mg/dL 10.5   PHOSPHORUS 2.5 - 4.9 mg/dL 3.5   Albumin 3.4 - 5.0 g/dL 5.0   Alkaline Phosphatase 33 - 120 U/L 66   ALT 10 - 52 U/L 23   AST 9 - 39 U/L 21   Bilirubin Total 0.0 - 1.2 mg/dL 2.3 (H)   Bilirubin, Direct 0.0 - 0.3 mg/dL 0.4 (H)   Total Protein 6.4 - 8.2 g/dL 8.4 (H)       ASSESSMENT and PLAN:    Assessment:  Joaquin is a 19 year old male with severe aplastic anemia here for follow up. He is s/p ATG (2/23-2/26/2022) and is currently on CSA and Eltrombopag. His course was complicated by steroid induced hyperglycemia/hypertension (resolved), CSA induced gingival hyperplasia (responded to Azithomycin and gum reduction procedure by periodontist) which is also resolved now. He has responded to immunosuppressive therapy with count recovery which has since been stable. His last PRBC transfusion was on 4/1/22 and platelets were on 4/25/22 (given prior to dental procedure). He has been on Cyclosporine since March 2022, and has begun his Cyclosporine wean on 3/25/24 and is currently taking 150mg daily. CBC today is stable with Hb 13.4, WBC 4.7 with normal ANC 1960,  platelet count 151K. Given continued stability of counts, will continue with wean today to ~125mg daily.    Plan:  Aplastic Anemia:  - Will continue to wean Cyclosporine from 150mg -> 125mg daily (refill sent to [facility])  - Continue Promacta 150mg daily  - PJP prophylaxis: Received Pentamidine today. Will continue q4 weeks  - Fungal prophylaxis: Continue Fluconazole 400mg daily    PNH:  - Last PNH profile on 2/26/24 was positive. Will repeat PNH profile at next clinic appointment.     RTC x 4 weeks for follow up, labs and further wean.     Plan discussed with patient who voiced understanding and all questions were answered  Patient was seen and discussed with Pediatric Hematologist/Oncologist, Dr. Kamran Hill MD

## 2025-02-03 NOTE — PATIENT INSTRUCTIONS
PLEASE CALL your medical team at [phone] for any questions, concerns &/or the following reasons:  -Fever: temperature  greater than 100.4 F  -Low grade temperature less than 100.4F that occurs 2 times within a 12 hour period  -Shaking chills or shivering with or without fever.  -Uncontrolled nausea or vomiting.  -No bowel movement/stool in two days or for frequent episodes of diarrhea.  -Uncontrolled bleeding or bruising.    ADDITIONAL INSTRUCTIONS:  -Follow the treatment calendar provided for you.  -Take all medications as prescribed.  -DO NOT take or give tylenol or ibuprofen without contacting your medical team first.    In order to provide safe and effective care to you and all of our patients, we are asking that if you or your child is experiencing any of the symptoms below that you please call our triage nurse [phone] prior to your arrival.    These symptoms include but are not limited to:   Fevers within the last 24 hours   Uncontrolled pain   Vomiting or diarrhea   Coughing or runny nose   Bleeding actively and lasting longer than 15 minutes (including nose bleeds, gum bleeding, etc.)   Dizziness and/or weakness   Any rash   Changes in mental status    MyChart:  Please send non-urgent messages only.  Messages are checked during regular business hours, Monday - Friday 8:00-4:30pm.  *It may take up to 2 business days for our team to reply.

## 2025-02-08 RX ORDER — CYCLOSPORINE 25 MG/1
50 CAPSULE, LIQUID FILLED ORAL 2 TIMES DAILY
Qty: 120 CAPSULE | Refills: 1 | Status: SHIPPED | OUTPATIENT
Start: 2025-02-08 | End: 2025-04-09

## 2025-02-08 RX ORDER — FLUCONAZOLE 200 MG/1
400 TABLET ORAL DAILY
Qty: 28 TABLET | Refills: 4 | Status: SHIPPED | OUTPATIENT
Start: 2025-02-08 | End: 2025-04-19

## 2025-02-08 RX ORDER — CYCLOSPORINE 100 MG/1
100 CAPSULE, LIQUID FILLED ORAL DAILY
Qty: 30 CAPSULE | Refills: 11 | Status: SHIPPED | OUTPATIENT
Start: 2025-02-08 | End: 2026-02-08

## 2025-02-08 RX ORDER — ELTROMBOPAG OLAMINE 75 MG/1
150 TABLET, FILM COATED ORAL
Qty: 60 TABLET | Refills: 3 | Status: SHIPPED | OUTPATIENT
Start: 2025-02-08

## 2025-02-26 ENCOUNTER — TELEPHONE (OUTPATIENT)
Dept: PEDIATRIC HEMATOLOGY/ONCOLOGY | Facility: HOSPITAL | Age: 20
End: 2025-02-26
Payer: COMMERCIAL

## 2025-02-26 NOTE — TELEPHONE ENCOUNTER
Patient's PBM preferred TPO drug changed from Promacta to Avaiz in 2025. Office's initial prior authorization request to remain on Promacta has been denied. Next options are either appeal or prescribe Alvaiz. Medical team notified & waiting for instructions.     Medication Plan: Patient's platelets have been stable on Cyclosporine & Promacta. Medications are currently being weaned. Wean is estimated to take approximately 6 months. Dr. Andrew does not advise changing medications mid wean, as Alvaiz will require dose titration, followed by wean.     [facility]  Phone: [phone]  Fax: [phone]

## 2025-03-03 ENCOUNTER — HOSPITAL ENCOUNTER (OUTPATIENT)
Dept: PEDIATRIC HEMATOLOGY/ONCOLOGY | Facility: HOSPITAL | Age: 20
Discharge: HOME | End: 2025-03-03
Payer: COMMERCIAL

## 2025-03-03 VITALS
DIASTOLIC BLOOD PRESSURE: 67 MMHG | SYSTOLIC BLOOD PRESSURE: 114 MMHG | HEIGHT: 71 IN | TEMPERATURE: 97.3 F | HEART RATE: 71 BPM | RESPIRATION RATE: 18 BRPM | WEIGHT: 186.29 LBS | BODY MASS INDEX: 26.08 KG/M2

## 2025-03-03 DIAGNOSIS — D61.9 APLASTIC ANEMIA (MULTI): Primary | ICD-10-CM

## 2025-03-03 LAB
ALBUMIN SERPL BCP-MCNC: 4.4 G/DL (ref 3.4–5)
ALP SERPL-CCNC: 52 U/L (ref 33–120)
ALT SERPL W P-5'-P-CCNC: 11 U/L (ref 10–52)
ANION GAP SERPL CALC-SCNC: 11 MMOL/L (ref 10–20)
AST SERPL W P-5'-P-CCNC: 15 U/L (ref 9–39)
BASOPHILS # BLD AUTO: 0.02 X10*3/UL (ref 0–0.1)
BASOPHILS NFR BLD AUTO: 0.4 %
BILIRUB DIRECT SERPL-MCNC: 0.1 MG/DL (ref 0–0.3)
BILIRUB SERPL-MCNC: 0.7 MG/DL (ref 0–1.2)
BUN SERPL-MCNC: 13 MG/DL (ref 6–23)
CALCIUM SERPL-MCNC: 9.3 MG/DL (ref 8.6–10.6)
CHLORIDE SERPL-SCNC: 106 MMOL/L (ref 98–107)
CO2 SERPL-SCNC: 25 MMOL/L (ref 21–32)
CREAT SERPL-MCNC: 0.98 MG/DL (ref 0.5–1.3)
CYCLOSPORINE BLD IA-MCNC: 56 NG/ML (ref 80–210)
EGFRCR SERPLBLD CKD-EPI 2021: >90 ML/MIN/1.73M*2
EOSINOPHIL # BLD AUTO: 0.06 X10*3/UL (ref 0–0.7)
EOSINOPHIL NFR BLD AUTO: 1.3 %
ERYTHROCYTE [DISTWIDTH] IN BLOOD BY AUTOMATED COUNT: 12.4 % (ref 11.5–14.5)
GLUCOSE SERPL-MCNC: 91 MG/DL (ref 74–99)
HCT VFR BLD AUTO: 36.9 % (ref 41–52)
HGB BLD-MCNC: 12.7 G/DL (ref 13.5–17.5)
HGB RETIC QN: 32 PG (ref 28–38)
IMM GRANULOCYTES # BLD AUTO: 0.02 X10*3/UL (ref 0–0.7)
IMM GRANULOCYTES NFR BLD AUTO: 0.4 % (ref 0–0.9)
IMMATURE RETIC FRACTION: 11.3 %
LYMPHOCYTES # BLD AUTO: 1.93 X10*3/UL (ref 1.2–4.8)
LYMPHOCYTES NFR BLD AUTO: 41.7 %
MCH RBC QN AUTO: 29.7 PG (ref 26–34)
MCHC RBC AUTO-ENTMCNC: 34.4 G/DL (ref 32–36)
MCV RBC AUTO: 86 FL (ref 80–100)
MONOCYTES # BLD AUTO: 0.49 X10*3/UL (ref 0.1–1)
MONOCYTES NFR BLD AUTO: 10.6 %
NEUTROPHILS # BLD AUTO: 2.11 X10*3/UL (ref 1.2–7.7)
NEUTROPHILS NFR BLD AUTO: 45.6 %
NRBC BLD-RTO: 0 /100 WBCS (ref 0–0)
PHOSPHATE SERPL-MCNC: 4.2 MG/DL (ref 2.5–4.9)
PLATELET # BLD AUTO: 145 X10*3/UL (ref 150–450)
POTASSIUM SERPL-SCNC: 4.2 MMOL/L (ref 3.5–5.3)
PROT SERPL-MCNC: 6.9 G/DL (ref 6.4–8.2)
RBC # BLD AUTO: 4.28 X10*6/UL (ref 4.5–5.9)
RETICS #: 0.08 X10*6/UL (ref 0.02–0.12)
RETICS/RBC NFR AUTO: 1.8 % (ref 0.5–2)
SODIUM SERPL-SCNC: 138 MMOL/L (ref 136–145)
WBC # BLD AUTO: 4.6 X10*3/UL (ref 4.4–11.3)

## 2025-03-03 PROCEDURE — 2500000004 HC RX 250 GENERAL PHARMACY W/ HCPCS (ALT 636 FOR OP/ED): Performed by: STUDENT IN AN ORGANIZED HEALTH CARE EDUCATION/TRAINING PROGRAM

## 2025-03-03 PROCEDURE — 80158 DRUG ASSAY CYCLOSPORINE: CPT | Performed by: STUDENT IN AN ORGANIZED HEALTH CARE EDUCATION/TRAINING PROGRAM

## 2025-03-03 PROCEDURE — 36415 COLL VENOUS BLD VENIPUNCTURE: CPT | Performed by: STUDENT IN AN ORGANIZED HEALTH CARE EDUCATION/TRAINING PROGRAM

## 2025-03-03 PROCEDURE — 80053 COMPREHEN METABOLIC PANEL: CPT | Performed by: STUDENT IN AN ORGANIZED HEALTH CARE EDUCATION/TRAINING PROGRAM

## 2025-03-03 PROCEDURE — 2500000002 HC RX 250 W HCPCS SELF ADMINISTERED DRUGS (ALT 637 FOR MEDICARE OP, ALT 636 FOR OP/ED): Performed by: STUDENT IN AN ORGANIZED HEALTH CARE EDUCATION/TRAINING PROGRAM

## 2025-03-03 PROCEDURE — 94642 AEROSOL INHALATION TREATMENT: CPT

## 2025-03-03 PROCEDURE — 85025 COMPLETE CBC W/AUTO DIFF WBC: CPT | Performed by: STUDENT IN AN ORGANIZED HEALTH CARE EDUCATION/TRAINING PROGRAM

## 2025-03-03 PROCEDURE — 85045 AUTOMATED RETICULOCYTE COUNT: CPT | Performed by: STUDENT IN AN ORGANIZED HEALTH CARE EDUCATION/TRAINING PROGRAM

## 2025-03-03 PROCEDURE — 88185 FLOWCYTOMETRY/TC ADD-ON: CPT | Performed by: STUDENT IN AN ORGANIZED HEALTH CARE EDUCATION/TRAINING PROGRAM

## 2025-03-03 PROCEDURE — 84100 ASSAY OF PHOSPHORUS: CPT | Performed by: STUDENT IN AN ORGANIZED HEALTH CARE EDUCATION/TRAINING PROGRAM

## 2025-03-03 PROCEDURE — 82248 BILIRUBIN DIRECT: CPT | Performed by: STUDENT IN AN ORGANIZED HEALTH CARE EDUCATION/TRAINING PROGRAM

## 2025-03-03 RX ORDER — ALBUTEROL SULFATE 0.83 MG/ML
5 SOLUTION RESPIRATORY (INHALATION) ONCE
OUTPATIENT
Start: 2025-03-31 | End: 2025-03-31

## 2025-03-03 RX ORDER — PENTAMIDINE ISETHIONATE 300 MG/300MG
300 INHALANT RESPIRATORY (INHALATION) ONCE
Status: COMPLETED | OUTPATIENT
Start: 2025-03-03 | End: 2025-03-03

## 2025-03-03 RX ORDER — PENTAMIDINE ISETHIONATE 300 MG/300MG
300 INHALANT RESPIRATORY (INHALATION) ONCE
OUTPATIENT
Start: 2025-03-31 | End: 2025-03-31

## 2025-03-03 RX ORDER — ALBUTEROL SULFATE 0.83 MG/ML
2.5 SOLUTION RESPIRATORY (INHALATION) ONCE AS NEEDED
OUTPATIENT
Start: 2025-03-31

## 2025-03-03 RX ORDER — DIPHENHYDRAMINE HYDROCHLORIDE 50 MG/ML
50 INJECTION INTRAMUSCULAR; INTRAVENOUS ONCE AS NEEDED
OUTPATIENT
Start: 2025-03-31

## 2025-03-03 RX ORDER — ALBUTEROL SULFATE 0.83 MG/ML
5 SOLUTION RESPIRATORY (INHALATION) ONCE
Status: COMPLETED | OUTPATIENT
Start: 2025-03-03 | End: 2025-03-03

## 2025-03-03 RX ORDER — EPINEPHRINE 1 MG/ML
0.01 INJECTION, SOLUTION, CONCENTRATE INTRAVENOUS ONCE AS NEEDED
OUTPATIENT
Start: 2025-03-31

## 2025-03-03 RX ADMIN — PENTAMIDINE ISETHIONATE 300 MG: 300 INHALANT RESPIRATORY (INHALATION) at 11:37

## 2025-03-03 RX ADMIN — ALBUTEROL SULFATE 5 MG: 2.5 SOLUTION RESPIRATORY (INHALATION) at 11:36

## 2025-03-03 ASSESSMENT — ENCOUNTER SYMPTOMS
DIZZINESS: 0
SLEEP DISTURBANCE: 0
MYALGIAS: 0
RECTAL PAIN: 0
ACTIVITY CHANGE: 0
CARDIOVASCULAR NEGATIVE: 1
ABDOMINAL PAIN: 0
RHINORRHEA: 0
ENDOCRINE NEGATIVE: 1
HEMATOLOGIC/LYMPHATIC NEGATIVE: 1
HEADACHES: 0
PSYCHIATRIC NEGATIVE: 1
NEUROLOGICAL NEGATIVE: 1
UNEXPECTED WEIGHT CHANGE: 0
FEVER: 0
APPETITE CHANGE: 0
OCCASIONAL FEELINGS OF UNSTEADINESS: 0
COLOR CHANGE: 0
ADENOPATHY: 0
COUGH: 0
CHEST TIGHTNESS: 0
SHORTNESS OF BREATH: 0
LOSS OF SENSATION IN FEET: 0
PALPITATIONS: 0
BLOOD IN STOOL: 0
NUMBNESS: 0
EYES NEGATIVE: 1
FATIGUE: 0
DEPRESSION: 0
BRUISES/BLEEDS EASILY: 0
EYE PAIN: 0

## 2025-03-03 ASSESSMENT — COLUMBIA-SUICIDE SEVERITY RATING SCALE - C-SSRS
2. HAVE YOU ACTUALLY HAD ANY THOUGHTS OF KILLING YOURSELF?: NO
1. IN THE PAST MONTH, HAVE YOU WISHED YOU WERE DEAD OR WISHED YOU COULD GO TO SLEEP AND NOT WAKE UP?: NO
6. HAVE YOU EVER DONE ANYTHING, STARTED TO DO ANYTHING, OR PREPARED TO DO ANYTHING TO END YOUR LIFE?: NO

## 2025-03-03 ASSESSMENT — PAIN SCALES - GENERAL: PAINLEVEL_OUTOF10: 0-NO PAIN

## 2025-03-03 NOTE — PROGRESS NOTES
Patient ID: Joaquin Talbot is a 20 y.o. male.  Referring Physician: Julio Villeda MD  [id number]  Department of Pediatrics-Hematology [place] [address]  Primary Care Provider: No primary care provider on file.    Date of Service:  3/3/2025    SUBJECTIVE:    History of Present Illness:  Joaquin is a 20 year old male with severe aplastic anemia on immunosuppressive therapy here for follow up.      Joaquin was last seen in [facility] on 2/3/25. At his last visit, he was weaned from 150mg ->125mg daily, which he mostly reports adherence to. He has missed some doses of promacta due to awaiting insurance approval. He reports adherence with fluconazole. No recent fever, cough, rhinorrhea or nasal congestion. He reports good energy levels throughout the day. Denies any chest pain, palpitations, shortness of breath at rest or on exertion, dizziness, syncopal episodes or headaches. Denies any easy/spontaneous bruising, however, reports 2 episodes of bright red blood upon wiping after stooling. He has not noticed any blood in the toilet bowl. He denies constipation, hard stools or rectal pain with stooling. No other bleeding such as hematuria, epistaxis or gum bleeding. Denies any oral sores, throat pain or rectal pain. Joaquin has no new concerns today.        Past Medical History: Joaquin has a past medical history of Body mass index (BMI) pediatric, 5th percentile to less than 85th percentile for age (04/30/2022), Encounter for examination for participation in sport (08/02/2021), Encounter for routine child health examination without abnormal findings (08/31/2020), Essential (primary) hypertension, Hemorrhage due to vascular prosthetic devices, implants and grafts, initial encounter (CMS-[facility]) (03/08/2022), Other conditions influencing health status (12/22/2021), Other disorders affecting eyelid function (02/03/2017), Personal history of other diseases of the respiratory system  "(12/23/2021), Personal history of other diseases of the respiratory system (12/22/2021), Personal history of other drug therapy (01/28/2022), Personal history of other specified conditions (12/22/2021), Strain of adductor muscle, fascia and tendon of unspecified thigh, initial encounter (09/15/2021), and Unspecified injury of abdomen, initial encounter (02/03/2017).    Surgical History:  Joaquin has a past surgical history that includes Other surgical history (08/04/2022) and Other surgical history (08/04/2022).    Social History:  Joaquin attends [employer]    Family History   Problem Relation Name Age of Onset    Diabetes Father         Review of Systems   Constitutional:  Negative for activity change, appetite change, fatigue, fever and unexpected weight change.   HENT:  Negative for congestion and rhinorrhea.    Eyes: Negative.  Negative for pain.   Respiratory:  Negative for cough, chest tightness and shortness of breath.    Cardiovascular: Negative.  Negative for chest pain, palpitations and leg swelling.   Gastrointestinal:  Negative for abdominal pain, blood in stool and rectal pain.   Endocrine: Negative.    Genitourinary: Negative.    Musculoskeletal:  Negative for myalgias.   Skin: Negative.  Negative for color change and rash.   Allergic/Immunologic: Positive for immunocompromised state.   Neurological: Negative.  Negative for dizziness, numbness and headaches.   Hematological: Negative.  Negative for adenopathy. Does not bruise/bleed easily.   Psychiatric/Behavioral: Negative.  Negative for behavioral problems and sleep disturbance.    All other systems reviewed and are negative.      VS:  /67 (BP Location: Right arm, Patient Position: Sitting, BP Cuff Size: Adult)   Pulse 71   Temp 36.3 °C (97.3 °F) (Oral)   Resp 18   Ht 1.815 m (5' 11.46\")   Wt 84.5 kg (186 lb 4.6 oz)   BMI 25.65 kg/m²   BSA: 2.06 meters squared    Physical Exam  Vitals and nursing note reviewed.   Constitutional:       " General: He is not in acute distress.     Appearance: Normal appearance. He is normal weight. He is not ill-appearing or toxic-appearing.   HENT:      Head: Normocephalic and atraumatic.      Nose: Nose normal.      Mouth/Throat:      Mouth: Mucous membranes are moist.      Pharynx: No oropharyngeal exudate or posterior oropharyngeal erythema.   Eyes:      General: No scleral icterus.     Extraocular Movements: Extraocular movements intact.      Conjunctiva/sclera: Conjunctivae normal.      Pupils: Pupils are equal, round, and reactive to light.   Cardiovascular:      Rate and Rhythm: Normal rate and regular rhythm.      Pulses: Normal pulses.      Heart sounds: Normal heart sounds. No murmur heard.  Pulmonary:      Effort: Pulmonary effort is normal. No respiratory distress.      Breath sounds: Normal breath sounds. No rhonchi or rales.   Abdominal:      General: Abdomen is flat. Bowel sounds are normal. There is no distension.      Palpations: Abdomen is soft. There is no mass.      Tenderness: There is no abdominal tenderness. There is no guarding.      Comments: No hepatosplenomegaly   Musculoskeletal:         General: Normal range of motion.      Cervical back: Normal range of motion and neck supple.   Lymphadenopathy:      Cervical: No cervical adenopathy.   Skin:     General: Skin is warm.      Capillary Refill: Capillary refill takes less than 2 seconds.      Coloration: Skin is not pale.      Findings: No bruising or rash.   Neurological:      General: No focal deficit present.      Mental Status: He is alert and oriented to person, place, and time. Mental status is at baseline.   Psychiatric:         Mood and Affect: Mood normal.       Laboratory:   Latest Reference Range & Units 03/03/25 10:18   WBC 4.4 - 11.3 x10*3/uL 4.6   nRBC 0.0 - 0.0 /100 WBCs 0.0   RBC 4.50 - 5.90 x10*6/uL 4.28 (L)   HEMOGLOBIN 13.5 - 17.5 g/dL 12.7 (L)   HEMATOCRIT 41.0 - 52.0 % 36.9 (L)   MCV 80 - 100 fL 86   MCH 26.0 - 34.0 pg  29.7   MCHC 32.0 - 36.0 g/dL 34.4   RED CELL DISTRIBUTION WIDTH 11.5 - 14.5 % 12.4   Platelets 150 - 450 x10*3/uL 145 (L)      Latest Reference Range & Units 03/03/25 10:18   Neutrophils Absolute 1.20 - 7.70 x10*3/uL 2.11      Latest Reference Range & Units 03/03/25 10:18   Retic % 0.5 - 2.0 % 1.8      Latest Reference Range & Units 03/03/25 10:18   Creatinine 0.50 - 1.30 mg/dL 0.98      Latest Reference Range & Units 03/03/25 10:18   ALT 10 - 52 U/L 11   AST 9 - 39 U/L 15   Bilirubin Total 0.0 - 1.2 mg/dL 0.7   Bilirubin, Direct 0.0 - 0.3 mg/dL 0.1      Latest Reference Range & Units 03/03/25 10:18   Cyclosporine 80 - 210 ng/mL 56 (L)     ASSESSMENT and PLAN:    Assessment:  Joaquin is a 20 year old male with severe aplastic anemia here for follow up. He is s/p ATG (2/23-2/26/2022) and is currently on CSA and Eltrombopag. His course was complicated by steroid induced hyperglycemia/hypertension (resolved), CSA induced gingival hyperplasia (responded to Azithomycin and gum reduction procedure by periodontist) which is also resolved now. He has responded to immunosuppressive therapy with count recovery which has since been stable. His last PRBC transfusion was on 4/1/22 and platelets were on 4/25/22 (given prior to dental procedure). He has been on Cyclosporine since March 2022, and has begun his Cyclosporine wean on 3/25/24 and is currently taking 125mg daily. CBC today shows Hb 12.7, WBC 4.6 with normal ANC 2110, platelet count 145K. He has not been taking promacta over he last 1-2 weeks due to insurance denial. Will restart promacta, and continue weaning cyclosporine as tolerated.    Plan:  Aplastic Anemia:  - Will continue to wean Cyclosporine from 125mg -> 100mg daily   - Continue Promacta 150mg daily (insurance approval received today)  - PJP prophylaxis: Received Pentamidine today. Will continue q4 weeks  - Fungal prophylaxis: Continue Fluconazole 400mg daily    PNH:  - Last PNH profile on 2/26/24 was  positive. Repeat PNH profile today pending    RTC x 4 weeks for follow up, labs and further wean.     Plan discussed with patient who voiced understanding and all questions were answered  Patient was seen and discussed with Pediatric Hematologist/Oncologist, Dr. Kamran Hill MD

## 2025-03-04 ENCOUNTER — TELEPHONE (OUTPATIENT)
Dept: PEDIATRIC HEMATOLOGY/ONCOLOGY | Facility: HOSPITAL | Age: 20
End: 2025-03-04
Payer: COMMERCIAL

## 2025-03-04 NOTE — TELEPHONE ENCOUNTER
Approval notice received from [facility] Appeals Department for Promacta. PBM approved appeal to continue current medication to allow for medication wean. Patient was informed of the decision at the 3/3/25 appointment by his medical team.     Prior Authorization Info  Drug: Promacta   Dose: 75mg tablets   Dispensing Pharmacy: [facility]      Phone Number: [phone]   Pharamcy Plan: [facility]       Phone Number: [phone]   Effective Dates: 1/30/25 - 3/1/26  Approval #: [id number]  Patient/Family Education: N/A

## 2025-03-07 LAB
FLOW CYTOMETRY SPECIALIST REVIEW: ABNORMAL
PATH REVIEW, PNH: ABNORMAL
PNH RESULTS: POSITIVE
RBC PNH CLONE TOTAL %: 1.3 %
WBC MONOCYTE PNH CLONE %: 1.2 %
WBC NEUTROPHIL PNH CLONE %: 0.7 %

## 2025-03-31 ENCOUNTER — HOSPITAL ENCOUNTER (OUTPATIENT)
Dept: PEDIATRIC HEMATOLOGY/ONCOLOGY | Facility: HOSPITAL | Age: 20
Discharge: HOME | End: 2025-03-31
Payer: COMMERCIAL

## 2025-03-31 VITALS
HEART RATE: 73 BPM | SYSTOLIC BLOOD PRESSURE: 109 MMHG | WEIGHT: 179.68 LBS | RESPIRATION RATE: 20 BRPM | BODY MASS INDEX: 25.15 KG/M2 | TEMPERATURE: 97.9 F | HEIGHT: 71 IN | DIASTOLIC BLOOD PRESSURE: 71 MMHG

## 2025-03-31 DIAGNOSIS — D84.9 IMMUNOCOMPROMISED: ICD-10-CM

## 2025-03-31 DIAGNOSIS — S99.911A INJURY OF RIGHT ANKLE, INITIAL ENCOUNTER: ICD-10-CM

## 2025-03-31 DIAGNOSIS — D61.9 APLASTIC ANEMIA: Primary | ICD-10-CM

## 2025-03-31 DIAGNOSIS — D61.9 APLASTIC ANEMIA: ICD-10-CM

## 2025-03-31 LAB
ALBUMIN SERPL BCP-MCNC: 4.5 G/DL (ref 3.4–5)
ALP SERPL-CCNC: 53 U/L (ref 33–120)
ALT SERPL W P-5'-P-CCNC: 19 U/L (ref 10–52)
ANION GAP SERPL CALC-SCNC: 13 MMOL/L (ref 10–20)
AST SERPL W P-5'-P-CCNC: 23 U/L (ref 9–39)
BASOPHILS # BLD AUTO: 0.02 X10*3/UL (ref 0–0.1)
BASOPHILS NFR BLD AUTO: 0.4 %
BILIRUB DIRECT SERPL-MCNC: 0.1 MG/DL (ref 0–0.3)
BILIRUB SERPL-MCNC: 1 MG/DL (ref 0–1.2)
BUN SERPL-MCNC: 13 MG/DL (ref 6–23)
CALCIUM SERPL-MCNC: 9.2 MG/DL (ref 8.6–10.6)
CHLORIDE SERPL-SCNC: 105 MMOL/L (ref 98–107)
CO2 SERPL-SCNC: 25 MMOL/L (ref 21–32)
CREAT SERPL-MCNC: 0.97 MG/DL (ref 0.5–1.3)
EGFRCR SERPLBLD CKD-EPI 2021: >90 ML/MIN/1.73M*2
EOSINOPHIL # BLD AUTO: 0.14 X10*3/UL (ref 0–0.7)
EOSINOPHIL NFR BLD AUTO: 2.9 %
ERYTHROCYTE [DISTWIDTH] IN BLOOD BY AUTOMATED COUNT: 12.3 % (ref 11.5–14.5)
GLUCOSE SERPL-MCNC: 87 MG/DL (ref 74–99)
HCT VFR BLD AUTO: 36.2 % (ref 41–52)
HGB BLD-MCNC: 12.4 G/DL (ref 13.5–17.5)
HGB RETIC QN: 34 PG (ref 28–38)
IMM GRANULOCYTES # BLD AUTO: 0.01 X10*3/UL (ref 0–0.7)
IMM GRANULOCYTES NFR BLD AUTO: 0.2 % (ref 0–0.9)
IMMATURE RETIC FRACTION: 11.9 %
LYMPHOCYTES # BLD AUTO: 1.81 X10*3/UL (ref 1.2–4.8)
LYMPHOCYTES NFR BLD AUTO: 37.1 %
MCH RBC QN AUTO: 28.9 PG (ref 26–34)
MCHC RBC AUTO-ENTMCNC: 34.3 G/DL (ref 32–36)
MCV RBC AUTO: 84 FL (ref 80–100)
MONOCYTES # BLD AUTO: 0.55 X10*3/UL (ref 0.1–1)
MONOCYTES NFR BLD AUTO: 11.3 %
NEUTROPHILS # BLD AUTO: 2.35 X10*3/UL (ref 1.2–7.7)
NEUTROPHILS NFR BLD AUTO: 48.1 %
NRBC BLD-RTO: 0 /100 WBCS (ref 0–0)
PHOSPHATE SERPL-MCNC: 4.3 MG/DL (ref 2.5–4.9)
PLATELET # BLD AUTO: 126 X10*3/UL (ref 150–450)
POTASSIUM SERPL-SCNC: 4.2 MMOL/L (ref 3.5–5.3)
PROT SERPL-MCNC: 7.4 G/DL (ref 6.4–8.2)
RBC # BLD AUTO: 4.29 X10*6/UL (ref 4.5–5.9)
RETICS #: 0.06 X10*6/UL (ref 0.02–0.12)
RETICS/RBC NFR AUTO: 1.5 % (ref 0.5–2)
SODIUM SERPL-SCNC: 139 MMOL/L (ref 136–145)
WBC # BLD AUTO: 4.9 X10*3/UL (ref 4.4–11.3)

## 2025-03-31 PROCEDURE — 84075 ASSAY ALKALINE PHOSPHATASE: CPT | Performed by: STUDENT IN AN ORGANIZED HEALTH CARE EDUCATION/TRAINING PROGRAM

## 2025-03-31 PROCEDURE — 84100 ASSAY OF PHOSPHORUS: CPT | Performed by: STUDENT IN AN ORGANIZED HEALTH CARE EDUCATION/TRAINING PROGRAM

## 2025-03-31 PROCEDURE — 2500000004 HC RX 250 GENERAL PHARMACY W/ HCPCS (ALT 636 FOR OP/ED): Performed by: STUDENT IN AN ORGANIZED HEALTH CARE EDUCATION/TRAINING PROGRAM

## 2025-03-31 PROCEDURE — 2500000002 HC RX 250 W HCPCS SELF ADMINISTERED DRUGS (ALT 637 FOR MEDICARE OP, ALT 636 FOR OP/ED): Performed by: STUDENT IN AN ORGANIZED HEALTH CARE EDUCATION/TRAINING PROGRAM

## 2025-03-31 PROCEDURE — 80048 BASIC METABOLIC PNL TOTAL CA: CPT | Performed by: STUDENT IN AN ORGANIZED HEALTH CARE EDUCATION/TRAINING PROGRAM

## 2025-03-31 PROCEDURE — 94642 AEROSOL INHALATION TREATMENT: CPT

## 2025-03-31 PROCEDURE — 36415 COLL VENOUS BLD VENIPUNCTURE: CPT | Performed by: STUDENT IN AN ORGANIZED HEALTH CARE EDUCATION/TRAINING PROGRAM

## 2025-03-31 PROCEDURE — 85025 COMPLETE CBC W/AUTO DIFF WBC: CPT | Performed by: STUDENT IN AN ORGANIZED HEALTH CARE EDUCATION/TRAINING PROGRAM

## 2025-03-31 PROCEDURE — 85045 AUTOMATED RETICULOCYTE COUNT: CPT | Performed by: STUDENT IN AN ORGANIZED HEALTH CARE EDUCATION/TRAINING PROGRAM

## 2025-03-31 RX ORDER — CYCLOSPORINE 25 MG/1
75 CAPSULE, LIQUID FILLED ORAL DAILY
Qty: 90 CAPSULE | Refills: 0 | Status: SHIPPED | OUTPATIENT
Start: 2025-03-31 | End: 2025-04-30

## 2025-03-31 RX ORDER — ALBUTEROL SULFATE 0.83 MG/ML
2.5 SOLUTION RESPIRATORY (INHALATION) ONCE AS NEEDED
OUTPATIENT
Start: 2025-04-28

## 2025-03-31 RX ORDER — PENTAMIDINE ISETHIONATE 300 MG/300MG
300 INHALANT RESPIRATORY (INHALATION) ONCE
OUTPATIENT
Start: 2025-04-28 | End: 2025-04-28

## 2025-03-31 RX ORDER — FLUCONAZOLE 200 MG/1
400 TABLET ORAL DAILY
Qty: 60 TABLET | Refills: 2 | Status: SHIPPED | OUTPATIENT
Start: 2025-03-31 | End: 2025-06-29

## 2025-03-31 RX ORDER — EPINEPHRINE 1 MG/ML
0.01 INJECTION, SOLUTION, CONCENTRATE INTRAVENOUS ONCE AS NEEDED
OUTPATIENT
Start: 2025-04-28

## 2025-03-31 RX ORDER — ALBUTEROL SULFATE 0.83 MG/ML
5 SOLUTION RESPIRATORY (INHALATION) ONCE
Status: COMPLETED | OUTPATIENT
Start: 2025-03-31 | End: 2025-03-31

## 2025-03-31 RX ORDER — ALBUTEROL SULFATE 0.83 MG/ML
5 SOLUTION RESPIRATORY (INHALATION) ONCE
OUTPATIENT
Start: 2025-04-28 | End: 2025-04-28

## 2025-03-31 RX ORDER — DIPHENHYDRAMINE HYDROCHLORIDE 50 MG/ML
50 INJECTION, SOLUTION INTRAMUSCULAR; INTRAVENOUS ONCE AS NEEDED
OUTPATIENT
Start: 2025-04-28

## 2025-03-31 RX ORDER — PENTAMIDINE ISETHIONATE 300 MG/300MG
300 INHALANT RESPIRATORY (INHALATION) ONCE
Status: COMPLETED | OUTPATIENT
Start: 2025-03-31 | End: 2025-03-31

## 2025-03-31 RX ADMIN — PENTAMIDINE ISETHIONATE 300 MG: 300 INHALANT RESPIRATORY (INHALATION) at 11:30

## 2025-03-31 RX ADMIN — ALBUTEROL SULFATE 5 MG: 2.5 SOLUTION RESPIRATORY (INHALATION) at 11:19

## 2025-03-31 ASSESSMENT — ENCOUNTER SYMPTOMS
MYALGIAS: 0
DEPRESSION: 0
CARDIOVASCULAR NEGATIVE: 1
HEMATOLOGIC/LYMPHATIC NEGATIVE: 1
EYES NEGATIVE: 1
SHORTNESS OF BREATH: 0
UNEXPECTED WEIGHT CHANGE: 0
DIZZINESS: 0
NUMBNESS: 0
FEVER: 0
APPETITE CHANGE: 0
BLOOD IN STOOL: 0
OCCASIONAL FEELINGS OF UNSTEADINESS: 0
ACTIVITY CHANGE: 0
COLOR CHANGE: 0
SLEEP DISTURBANCE: 0
FATIGUE: 0
ABDOMINAL PAIN: 0
ADENOPATHY: 0
PALPITATIONS: 0
HEADACHES: 0
EYE PAIN: 0
RECTAL PAIN: 0
NEUROLOGICAL NEGATIVE: 1
COUGH: 0
ENDOCRINE NEGATIVE: 1
LOSS OF SENSATION IN FEET: 0
RHINORRHEA: 0
PSYCHIATRIC NEGATIVE: 1
CHEST TIGHTNESS: 0
BRUISES/BLEEDS EASILY: 0

## 2025-03-31 ASSESSMENT — PAIN SCALES - GENERAL: PAINLEVEL_OUTOF10: 0-NO PAIN

## 2025-03-31 NOTE — PROGRESS NOTES
Patient ID: Joaquin Talbot is a 20 y.o. male.  Referring Physician: Julio Villeda MD  [id number]  Department of Pediatrics-Hematology [place] [address]  Primary Care Provider: No primary care provider on file.    Date of Service:  3/31/2025    SUBJECTIVE:    History of Present Illness:  Joaquin is a 20 year old male with severe aplastic anemia on immunosuppressive therapy here for follow up.      Joaquin was last seen in [facility] on 3/3/25. At his last visit, he was weaned from 125mg ->100mg daily, which he mostly reports adherence to, and has missed ~1-2 doses over the last 4 weeks. Joaquin restarted promacta 150mg daily ~ 2 days after his last appointment without any missed doses. He reports adherence with fluconazole. No recent fever, cough, rhinorrhea or nasal congestion. He reports good energy levels throughout the day. Denies any chest pain, palpitations, shortness of breath at rest or on exertion, dizziness, syncopal episodes or headaches. Denies any easy/spontaneous bruising, or bleeding such as hematuria, epistaxis or gum bleeding. Denies any oral sores, throat pain or rectal pain. Joaquin has no new concerns today.        Past Medical History: Joaquin has a past medical history of Body mass index (BMI) pediatric, 5th percentile to less than 85th percentile for age (04/30/2022), Encounter for examination for participation in sport (08/02/2021), Encounter for routine child health examination without abnormal findings (08/31/2020), Essential (primary) hypertension, Hemorrhage due to vascular prosthetic devices, implants and grafts, initial encounter (03/08/2022), Other conditions influencing health status (12/22/2021), Other disorders affecting eyelid function (02/03/2017), Personal history of other diseases of the respiratory system (12/23/2021), Personal history of other diseases of the respiratory system (12/22/2021), Personal history of other drug therapy  "(01/28/2022), Personal history of other specified conditions (12/22/2021), Strain of adductor muscle, fascia and tendon of unspecified thigh, initial encounter (09/15/2021), and Unspecified injury of abdomen, initial encounter (02/03/2017).    Surgical History:  Joaquin has a past surgical history that includes Other surgical history (08/04/2022) and Other surgical history (08/04/2022).    Social History:  Joaquin attends college    Family History   Problem Relation Name Age of Onset    Diabetes Father         Review of Systems   Constitutional:  Negative for activity change, appetite change, fatigue, fever and unexpected weight change.   HENT:  Negative for congestion and rhinorrhea.    Eyes: Negative.  Negative for pain.   Respiratory:  Negative for cough, chest tightness and shortness of breath.    Cardiovascular: Negative.  Negative for chest pain, palpitations and leg swelling.   Gastrointestinal:  Negative for abdominal pain, blood in stool and rectal pain.   Endocrine: Negative.    Genitourinary: Negative.    Musculoskeletal:  Negative for myalgias.   Skin: Negative.  Negative for color change and rash.   Allergic/Immunologic: Positive for immunocompromised state.   Neurological: Negative.  Negative for dizziness, numbness and headaches.   Hematological: Negative.  Negative for adenopathy. Does not bruise/bleed easily.   Psychiatric/Behavioral: Negative.  Negative for behavioral problems and sleep disturbance.    All other systems reviewed and are negative.      VS:  /71 (BP Location: Right arm, Patient Position: Sitting, BP Cuff Size: Large adult)   Pulse 73   Temp 36.6 °C (97.9 °F) (Oral)   Resp 20   Ht 1.811 m (5' 11.3\")   Wt 81.5 kg (179 lb 10.8 oz)   BMI 24.85 kg/m²   BSA: 2.02 meters squared    Physical Exam  Vitals and nursing note reviewed.   Constitutional:       General: He is not in acute distress.     Appearance: Normal appearance. He is normal weight. He is not ill-appearing or " toxic-appearing.   HENT:      Head: Normocephalic and atraumatic.      Nose: Nose normal.      Mouth/Throat:      Mouth: Mucous membranes are moist.      Pharynx: No oropharyngeal exudate or posterior oropharyngeal erythema.   Eyes:      General: No scleral icterus.     Extraocular Movements: Extraocular movements intact.      Conjunctiva/sclera: Conjunctivae normal.      Pupils: Pupils are equal, round, and reactive to light.   Cardiovascular:      Rate and Rhythm: Normal rate and regular rhythm.      Pulses: Normal pulses.      Heart sounds: Normal heart sounds. No murmur heard.  Pulmonary:      Effort: Pulmonary effort is normal. No respiratory distress.      Breath sounds: Normal breath sounds. No rhonchi or rales.   Abdominal:      General: Abdomen is flat. Bowel sounds are normal. There is no distension.      Palpations: Abdomen is soft. There is no mass.      Tenderness: There is no abdominal tenderness. There is no guarding.      Comments: No hepatosplenomegaly   Musculoskeletal:         General: Normal range of motion.      Cervical back: Normal range of motion and neck supple.   Lymphadenopathy:      Cervical: No cervical adenopathy.   Skin:     General: Skin is warm.      Capillary Refill: Capillary refill takes less than 2 seconds.      Coloration: Skin is not pale.      Findings: No bruising or rash.   Neurological:      General: No focal deficit present.      Mental Status: He is alert and oriented to person, place, and time. Mental status is at baseline.   Psychiatric:         Mood and Affect: Mood normal.       Laboratory:   Latest Reference Range & Units 03/31/25 10:29   WBC 4.4 - 11.3 x10*3/uL 4.9   nRBC 0.0 - 0.0 /100 WBCs 0.0   RBC 4.50 - 5.90 x10*6/uL 4.29 (L)   HEMOGLOBIN 13.5 - 17.5 g/dL 12.4 (L)   HEMATOCRIT 41.0 - 52.0 % 36.2 (L)   MCV 80 - 100 fL 84   MCH 26.0 - 34.0 pg 28.9   MCHC 32.0 - 36.0 g/dL 34.3   RED CELL DISTRIBUTION WIDTH 11.5 - 14.5 % 12.3   Platelets 150 - 450 x10*3/uL 126 (L)       Latest Reference Range & Units 03/31/25 10:29   Neutrophils Absolute 1.20 - 7.70 x10*3/uL 2.35      Latest Reference Range & Units 03/31/25 10:29   Retic % 0.5 - 2.0 % 1.5      Latest Reference Range & Units 03/31/25 10:29   Creatinine 0.50 - 1.30 mg/dL 0.97      Latest Reference Range & Units 03/31/25 10:29   ALT 10 - 52 U/L 19   AST 9 - 39 U/L 23   Bilirubin Total 0.0 - 1.2 mg/dL 1.0   Bilirubin, Direct 0.0 - 0.3 mg/dL 0.1     ASSESSMENT and PLAN:    Assessment:  Joaquin is a 20 year old male with severe aplastic anemia here for follow up. He is s/p ATG (2/23-2/26/2022) and is currently on CSA and Eltrombopag. His course was complicated by steroid induced hyperglycemia/hypertension (resolved), CSA induced gingival hyperplasia (responded to Azithomycin and gum reduction procedure by periodontist) which is also resolved now. He has responded to immunosuppressive therapy with count recovery which has since been stable. His last PRBC transfusion was on 4/1/22 and platelets were on 4/25/22 (given prior to dental procedure). He has been on Cyclosporine since March 2022, and has begun his Cyclosporine wean on 3/25/24 and is currently taking 100mg daily. CBC today shows stable Hb 12.4, WBC 4.9 with normal ANC 2350, however platelet count down trended to 126K despite reported adherence to promacta. Will continue cyclosporine wean along with promacta and follow up in one month.     Plan:  Aplastic Anemia:  - Will continue to wean Cyclosporine from 100mg -> 75mg daily   - Continue Promacta 150mg daily. Discussed if any easy bruising or petechiae, that labs will need to be done at that time  - PJP prophylaxis: Received Pentamidine today. Will continue q4 weeks  - Fungal prophylaxis: Continue Fluconazole 400mg daily    PNH:  - Last PNH profile on 3/3/25 remains positive but overall similar to previous. Repeat PNH profile in one year (~March 2026)    RTC x 4 weeks for follow up, labs and further wean.     Plan discussed  with patient who voiced understanding and all questions were answered  Patient was seen and discussed with Pediatric Hematologist/Oncologist, Dr. Kamran Hill MD

## 2025-03-31 NOTE — PROGRESS NOTES
Restorationism tolerated albuterol and Pentamidine aerosol treatment without incident.  Precautions maintained. To RTC in 4wks.  Discharged to home with family.

## 2025-05-19 ENCOUNTER — HOSPITAL ENCOUNTER (OUTPATIENT)
Dept: PEDIATRIC HEMATOLOGY/ONCOLOGY | Facility: HOSPITAL | Age: 20
Discharge: HOME | End: 2025-05-19
Payer: COMMERCIAL

## 2025-05-19 VITALS
HEIGHT: 71 IN | BODY MASS INDEX: 25.43 KG/M2 | SYSTOLIC BLOOD PRESSURE: 126 MMHG | DIASTOLIC BLOOD PRESSURE: 71 MMHG | WEIGHT: 181.66 LBS | RESPIRATION RATE: 20 BRPM | TEMPERATURE: 96.4 F | HEART RATE: 59 BPM

## 2025-05-19 DIAGNOSIS — D61.9 APLASTIC ANEMIA: Primary | ICD-10-CM

## 2025-05-19 DIAGNOSIS — D61.9 APLASTIC ANEMIA: ICD-10-CM

## 2025-05-19 LAB
ALBUMIN SERPL BCP-MCNC: 4.6 G/DL (ref 3.4–5)
ALP SERPL-CCNC: 54 U/L (ref 33–120)
ALT SERPL W P-5'-P-CCNC: 12 U/L (ref 10–52)
ANION GAP SERPL CALC-SCNC: 12 MMOL/L (ref 10–20)
AST SERPL W P-5'-P-CCNC: 17 U/L (ref 9–39)
BASOPHILS # BLD AUTO: 0.01 X10*3/UL (ref 0–0.1)
BASOPHILS NFR BLD AUTO: 0.2 %
BILIRUB DIRECT SERPL-MCNC: 0.3 MG/DL (ref 0–0.3)
BILIRUB SERPL-MCNC: 1.6 MG/DL (ref 0–1.2)
BUN SERPL-MCNC: 13 MG/DL (ref 6–23)
CALCIUM SERPL-MCNC: 9.8 MG/DL (ref 8.6–10.6)
CHLORIDE SERPL-SCNC: 102 MMOL/L (ref 98–107)
CO2 SERPL-SCNC: 27 MMOL/L (ref 21–32)
CREAT SERPL-MCNC: 1.04 MG/DL (ref 0.5–1.3)
EGFRCR SERPLBLD CKD-EPI 2021: >90 ML/MIN/1.73M*2
EOSINOPHIL # BLD AUTO: 0.05 X10*3/UL (ref 0–0.7)
EOSINOPHIL NFR BLD AUTO: 1 %
ERYTHROCYTE [DISTWIDTH] IN BLOOD BY AUTOMATED COUNT: 12.5 % (ref 11.5–14.5)
GLUCOSE SERPL-MCNC: 82 MG/DL (ref 74–99)
HCT VFR BLD AUTO: 37.9 % (ref 41–52)
HGB BLD-MCNC: 12.9 G/DL (ref 13.5–17.5)
HGB RETIC QN: 34 PG (ref 28–38)
IMM GRANULOCYTES # BLD AUTO: 0.02 X10*3/UL (ref 0–0.7)
IMM GRANULOCYTES NFR BLD AUTO: 0.4 % (ref 0–0.9)
IMMATURE RETIC FRACTION: 13.7 %
LYMPHOCYTES # BLD AUTO: 1.69 X10*3/UL (ref 1.2–4.8)
LYMPHOCYTES NFR BLD AUTO: 35.3 %
MCH RBC QN AUTO: 28.6 PG (ref 26–34)
MCHC RBC AUTO-ENTMCNC: 34 G/DL (ref 32–36)
MCV RBC AUTO: 84 FL (ref 80–100)
MONOCYTES # BLD AUTO: 0.51 X10*3/UL (ref 0.1–1)
MONOCYTES NFR BLD AUTO: 10.6 %
NEUTROPHILS # BLD AUTO: 2.51 X10*3/UL (ref 1.2–7.7)
NEUTROPHILS NFR BLD AUTO: 52.5 %
NRBC BLD-RTO: 0 /100 WBCS (ref 0–0)
PHOSPHATE SERPL-MCNC: 3.1 MG/DL (ref 2.5–4.9)
PLATELET # BLD AUTO: 142 X10*3/UL (ref 150–450)
PLATELETS.RETICULATED NFR BLD AUTO: 2.5 % (ref 1–6)
POTASSIUM SERPL-SCNC: 3.9 MMOL/L (ref 3.5–5.3)
PROT SERPL-MCNC: 7.6 G/DL (ref 6.4–8.2)
RBC # BLD AUTO: 4.51 X10*6/UL (ref 4.5–5.9)
RETICS #: 0.07 X10*6/UL (ref 0.02–0.12)
RETICS/RBC NFR AUTO: 1.6 % (ref 0.5–2)
SODIUM SERPL-SCNC: 137 MMOL/L (ref 136–145)
WBC # BLD AUTO: 4.8 X10*3/UL (ref 4.4–11.3)

## 2025-05-19 PROCEDURE — 94642 AEROSOL INHALATION TREATMENT: CPT | Performed by: STUDENT IN AN ORGANIZED HEALTH CARE EDUCATION/TRAINING PROGRAM

## 2025-05-19 PROCEDURE — 2500000004 HC RX 250 GENERAL PHARMACY W/ HCPCS (ALT 636 FOR OP/ED): Performed by: STUDENT IN AN ORGANIZED HEALTH CARE EDUCATION/TRAINING PROGRAM

## 2025-05-19 PROCEDURE — 85055 RETICULATED PLATELET ASSAY: CPT | Performed by: STUDENT IN AN ORGANIZED HEALTH CARE EDUCATION/TRAINING PROGRAM

## 2025-05-19 PROCEDURE — 2500000002 HC RX 250 W HCPCS SELF ADMINISTERED DRUGS (ALT 637 FOR MEDICARE OP, ALT 636 FOR OP/ED)

## 2025-05-19 PROCEDURE — 85045 AUTOMATED RETICULOCYTE COUNT: CPT | Performed by: STUDENT IN AN ORGANIZED HEALTH CARE EDUCATION/TRAINING PROGRAM

## 2025-05-19 PROCEDURE — 82374 ASSAY BLOOD CARBON DIOXIDE: CPT | Performed by: STUDENT IN AN ORGANIZED HEALTH CARE EDUCATION/TRAINING PROGRAM

## 2025-05-19 PROCEDURE — 85025 COMPLETE CBC W/AUTO DIFF WBC: CPT | Performed by: STUDENT IN AN ORGANIZED HEALTH CARE EDUCATION/TRAINING PROGRAM

## 2025-05-19 PROCEDURE — 80076 HEPATIC FUNCTION PANEL: CPT | Performed by: STUDENT IN AN ORGANIZED HEALTH CARE EDUCATION/TRAINING PROGRAM

## 2025-05-19 PROCEDURE — 36415 COLL VENOUS BLD VENIPUNCTURE: CPT | Performed by: STUDENT IN AN ORGANIZED HEALTH CARE EDUCATION/TRAINING PROGRAM

## 2025-05-19 PROCEDURE — 84100 ASSAY OF PHOSPHORUS: CPT | Performed by: STUDENT IN AN ORGANIZED HEALTH CARE EDUCATION/TRAINING PROGRAM

## 2025-05-19 RX ORDER — ALBUTEROL SULFATE 0.83 MG/ML
2.5 SOLUTION RESPIRATORY (INHALATION) ONCE AS NEEDED
OUTPATIENT
Start: 2025-05-26

## 2025-05-19 RX ORDER — DIPHENHYDRAMINE HYDROCHLORIDE 50 MG/ML
50 INJECTION, SOLUTION INTRAMUSCULAR; INTRAVENOUS ONCE AS NEEDED
OUTPATIENT
Start: 2025-05-26

## 2025-05-19 RX ORDER — ALBUTEROL SULFATE 0.83 MG/ML
SOLUTION RESPIRATORY (INHALATION)
Status: COMPLETED
Start: 2025-05-19 | End: 2025-05-19

## 2025-05-19 RX ORDER — ALBUTEROL SULFATE 0.83 MG/ML
5 SOLUTION RESPIRATORY (INHALATION) ONCE
OUTPATIENT
Start: 2025-05-26 | End: 2025-05-26

## 2025-05-19 RX ORDER — EPINEPHRINE 1 MG/ML
0.01 INJECTION, SOLUTION, CONCENTRATE INTRAVENOUS ONCE AS NEEDED
OUTPATIENT
Start: 2025-05-26

## 2025-05-19 RX ORDER — PENTAMIDINE ISETHIONATE 300 MG/300MG
300 INHALANT RESPIRATORY (INHALATION) ONCE
Status: COMPLETED | OUTPATIENT
Start: 2025-05-19 | End: 2025-05-19

## 2025-05-19 RX ORDER — PENTAMIDINE ISETHIONATE 300 MG/300MG
300 INHALANT RESPIRATORY (INHALATION) ONCE
OUTPATIENT
Start: 2025-05-26 | End: 2025-05-26

## 2025-05-19 RX ORDER — ALBUTEROL SULFATE 0.83 MG/ML
5 SOLUTION RESPIRATORY (INHALATION) ONCE
Status: COMPLETED | OUTPATIENT
Start: 2025-05-19 | End: 2025-05-19

## 2025-05-19 RX ADMIN — ALBUTEROL SULFATE 5 MG: 0.83 SOLUTION RESPIRATORY (INHALATION) at 11:10

## 2025-05-19 RX ADMIN — ALBUTEROL SULFATE 5 MG: 2.5 SOLUTION RESPIRATORY (INHALATION) at 11:10

## 2025-05-19 RX ADMIN — PENTAMIDINE ISETHIONATE 300 MG: 300 INHALANT RESPIRATORY (INHALATION) at 11:27

## 2025-05-19 ASSESSMENT — ENCOUNTER SYMPTOMS
APPETITE CHANGE: 0
DEPRESSION: 0
HEMATOLOGIC/LYMPHATIC NEGATIVE: 1
NEUROLOGICAL NEGATIVE: 1
ABDOMINAL PAIN: 0
FATIGUE: 0
BRUISES/BLEEDS EASILY: 0
PALPITATIONS: 0
COUGH: 0
BLOOD IN STOOL: 0
ADENOPATHY: 0
OCCASIONAL FEELINGS OF UNSTEADINESS: 0
EYES NEGATIVE: 1
MYALGIAS: 0
HEADACHES: 0
UNEXPECTED WEIGHT CHANGE: 0
CARDIOVASCULAR NEGATIVE: 1
FEVER: 0
LOSS OF SENSATION IN FEET: 0
ACTIVITY CHANGE: 0
NUMBNESS: 0
PSYCHIATRIC NEGATIVE: 1
EYE PAIN: 0
COLOR CHANGE: 0
ENDOCRINE NEGATIVE: 1
SHORTNESS OF BREATH: 0
SLEEP DISTURBANCE: 0
DIZZINESS: 0
CHEST TIGHTNESS: 0
RHINORRHEA: 0
RECTAL PAIN: 0

## 2025-05-19 ASSESSMENT — COLUMBIA-SUICIDE SEVERITY RATING SCALE - C-SSRS
6. HAVE YOU EVER DONE ANYTHING, STARTED TO DO ANYTHING, OR PREPARED TO DO ANYTHING TO END YOUR LIFE?: NO
2. HAVE YOU ACTUALLY HAD ANY THOUGHTS OF KILLING YOURSELF?: NO
1. IN THE PAST MONTH, HAVE YOU WISHED YOU WERE DEAD OR WISHED YOU COULD GO TO SLEEP AND NOT WAKE UP?: NO

## 2025-05-19 ASSESSMENT — PAIN SCALES - GENERAL: PAINLEVEL_OUTOF10: 0-NO PAIN

## 2025-05-19 NOTE — PATIENT INSTRUCTIONS
PLEASE CALL your medical team at [phone] for any questions, concerns &/or the following reasons:  -Fever: temperature  greater than 100.4 F  -Low grade temperature less than 100.4F that occurs 2 times within a 12 hour period  -Shaking chills or shivering with or without fever.  -Uncontrolled nausea or vomiting.  -No bowel movement/stool in two days or for frequent episodes of diarrhea.  -Uncontrolled bleeding or bruising.    ADDITIONAL INSTRUCTIONS:  -Follow the treatment calendar provided for you.  -Take all medications as prescribed.  -DO NOT take or give tylenol or ibuprofen without contacting your medical team first.    In order to provide safe and effective care to you and all of our patients, we are asking that if you or your child is experiencing any of the symptoms below that you please call our triage nurse [phone] prior to your arrival.    These symptoms include but are not limited to:   Fevers within the last 24 hours   Uncontrolled pain   Vomiting or diarrhea   Coughing or runny nose   Bleeding actively and lasting longer than 15 minutes (including nose bleeds, gum bleeding, etc.)   Dizziness and/or weakness   Any rash   Changes in mental status    MyChart:  Please send non-urgent messages only.  Messages are checked during regular business hours, Monday - Friday 8:00-4:30pm.  *It may take up to 2 business days for our team to reply.

## 2025-05-19 NOTE — PROGRESS NOTES
Patient ID: Joaquin Talbot is a 20 y.o. male.  Referring Physician: Julio Villeda MD  [id number]  Department of Pediatrics-Hematology and [place] [address]  Primary Care Provider: No primary care provider on file.    Date of Service:  5/19/2025    SUBJECTIVE:    History of Present Illness:  Joaquin is a 20 year old male with severe aplastic anemia on immunosuppressive therapy here for follow up.      Joaquin was last seen in [facility] on 3/31/25. At his last visit, he was weaned from 100mg ->75mg daily, which he mostly reports adherence to. He also reports adherence to promacta and fluconazole. He reports recent sore throat and tonsillar swelling which self resolved without antibiotics. No recent fever, cough, rhinorrhea or nasal congestion. He reports good energy levels throughout the day. Denies any chest pain, palpitations, shortness of breath at rest or on exertion, dizziness, syncopal episodes or headaches. Denies any easy/spontaneous bruising, or bleeding such as hematuria, epistaxis or gum bleeding. Denies any oral sores, throat pain or rectal pain. Joaquin has no new concerns today.        Past Medical History: Joaquin has a past medical history of Body mass index (BMI) pediatric, 5th percentile to less than 85th percentile for age (04/30/2022), Encounter for examination for participation in sport (08/02/2021), Encounter for routine child health examination without abnormal findings (08/31/2020), Essential (primary) hypertension, Hemorrhage due to vascular prosthetic devices, implants and grafts, initial encounter (03/08/2022), Other conditions influencing health status (12/22/2021), Other disorders affecting eyelid function (02/03/2017), Personal history of other diseases of the respiratory system (12/23/2021), Personal history of other diseases of the respiratory system (12/22/2021), Personal history of other drug therapy (01/28/2022), Personal history of other  "specified conditions (12/22/2021), Strain of adductor muscle, fascia and tendon of unspecified thigh, initial encounter (09/15/2021), and Unspecified injury of abdomen, initial encounter (02/03/2017).    Surgical History:  Joaquin has a past surgical history that includes Other surgical history (08/04/2022) and Other surgical history (08/04/2022).    Social History:  Joaquin attends college    Family History   Problem Relation Name Age of Onset    Diabetes Father         Review of Systems   Constitutional:  Negative for activity change, appetite change, fatigue, fever and unexpected weight change.   HENT:  Negative for congestion and rhinorrhea.    Eyes: Negative.  Negative for pain.   Respiratory:  Negative for cough, chest tightness and shortness of breath.    Cardiovascular: Negative.  Negative for chest pain, palpitations and leg swelling.   Gastrointestinal:  Negative for abdominal pain, blood in stool and rectal pain.   Endocrine: Negative.    Genitourinary: Negative.    Musculoskeletal:  Negative for myalgias.   Skin: Negative.  Negative for color change and rash.   Allergic/Immunologic: Positive for immunocompromised state.   Neurological: Negative.  Negative for dizziness, numbness and headaches.   Hematological: Negative.  Negative for adenopathy. Does not bruise/bleed easily.   Psychiatric/Behavioral: Negative.  Negative for behavioral problems and sleep disturbance.    All other systems reviewed and are negative.      VS:  /71 (BP Location: Right arm, Patient Position: Sitting, BP Cuff Size: Adult)   Pulse 59   Temp 35.8 °C (96.4 °F) (Tympanic)   Resp 20   Ht 1.811 m (5' 11.3\")   Wt 82.4 kg (181 lb 10.5 oz)   BMI 25.12 kg/m²   BSA: 2.04 meters squared    Physical Exam  Vitals and nursing note reviewed.   Constitutional:       General: He is not in acute distress.     Appearance: Normal appearance. He is normal weight. He is not ill-appearing or toxic-appearing.   HENT:      Head: " Normocephalic and atraumatic.      Nose: Nose normal.      Mouth/Throat:      Mouth: Mucous membranes are moist.      Pharynx: No oropharyngeal exudate or posterior oropharyngeal erythema.   Eyes:      General: No scleral icterus.     Extraocular Movements: Extraocular movements intact.      Conjunctiva/sclera: Conjunctivae normal.      Pupils: Pupils are equal, round, and reactive to light.   Cardiovascular:      Rate and Rhythm: Normal rate and regular rhythm.      Pulses: Normal pulses.      Heart sounds: Normal heart sounds. No murmur heard.  Pulmonary:      Effort: Pulmonary effort is normal. No respiratory distress.      Breath sounds: Normal breath sounds. No rhonchi or rales.   Abdominal:      General: Abdomen is flat. Bowel sounds are normal. There is no distension.      Palpations: Abdomen is soft. There is no mass.      Tenderness: There is no abdominal tenderness. There is no guarding.      Comments: No hepatosplenomegaly   Musculoskeletal:         General: Normal range of motion.      Cervical back: Normal range of motion and neck supple.   Lymphadenopathy:      Cervical: No cervical adenopathy.   Skin:     General: Skin is warm.      Capillary Refill: Capillary refill takes less than 2 seconds.      Coloration: Skin is not pale.      Findings: No bruising or rash.   Neurological:      General: No focal deficit present.      Mental Status: He is alert and oriented to person, place, and time. Mental status is at baseline.   Psychiatric:         Mood and Affect: Mood normal.       Laboratory:   Latest Reference Range & Units 05/19/25 09:52   WBC 4.4 - 11.3 x10*3/uL 4.8   nRBC 0.0 - 0.0 /100 WBCs 0.0   RBC 4.50 - 5.90 x10*6/uL 4.51   HEMOGLOBIN 13.5 - 17.5 g/dL 12.9 (L)   HEMATOCRIT 41.0 - 52.0 % 37.9 (L)   MCV 80 - 100 fL 84   MCH 26.0 - 34.0 pg 28.6   MCHC 32.0 - 36.0 g/dL 34.0   RED CELL DISTRIBUTION WIDTH 11.5 - 14.5 % 12.5   Platelets 150 - 450 x10*3/uL 142 (L)      Latest Reference Range & Units  05/19/25 09:52   Neutrophils Absolute 1.20 - 7.70 x10*3/uL 2.51      Latest Reference Range & Units 05/19/25 09:52   GLUCOSE 74 - 99 mg/dL 82   SODIUM 136 - 145 mmol/L 137   POTASSIUM 3.5 - 5.3 mmol/L 3.9   CHLORIDE 98 - 107 mmol/L 102   Bicarbonate 21 - 32 mmol/L 27   Anion Gap 10 - 20 mmol/L 12   Blood Urea Nitrogen 6 - 23 mg/dL 13   Creatinine 0.50 - 1.30 mg/dL 1.04   EGFR >60 mL/min/1.73m*2 >90   Calcium 8.6 - 10.6 mg/dL 9.8   PHOSPHORUS 2.5 - 4.9 mg/dL 3.1   Albumin 3.4 - 5.0 g/dL 4.6   Alkaline Phosphatase 33 - 120 U/L 54   ALT 10 - 52 U/L 12   AST 9 - 39 U/L 17   Bilirubin Total 0.0 - 1.2 mg/dL 1.6 (H)   Bilirubin, Direct 0.0 - 0.3 mg/dL 0.3      Latest Reference Range & Units 05/19/25 09:52   Retic % 0.5 - 2.0 % 1.6       ASSESSMENT and PLAN:    Assessment:  Joaquin is a 20 year old male with severe aplastic anemia here for follow up. He is s/p ATG (2/23-2/26/2022) and is currently on CSA wean and Eltrombopag. His course was complicated by steroid induced hyperglycemia/hypertension (resolved), CSA induced gingival hyperplasia (responded to Azithomycin and gum reduction procedure by periodontist) which is also resolved now. He has responded to immunosuppressive therapy with count recovery which has since been stable. His last PRBC transfusion was on 4/1/22 and platelets were on 4/25/22 (given prior to dental procedure). He has been on Cyclosporine since March 2022, and has begun his Cyclosporine wean on 3/25/24 and is currently taking 75mg daily. CBC today shows stable Hb 12.9, WBC 4.8 with normal ANC 2510, and improvement in platelet count to 142K. Will continue cyclosporine wean along with promacta and follow up in one month.     Plan:  Aplastic Anemia:  - Will continue to wean Cyclosporine from 75mg -> 50mg daily   - Continue Promacta 150mg daily. Discussed if any easy bruising or petechiae, that labs will need to be done at that time  - PJP prophylaxis: Received Pentamidine today. Will continue q4  weeks  - Fungal prophylaxis: Continue Fluconazole 400mg daily    PNH:  - Last PNH profile on 3/3/25 remains positive but overall similar to previous. Repeat PNH profile in one year (~March 2026)    RTC on 6/9/25 for follow up, labs and further wean.     Plan discussed with patient who voiced understanding and all questions were answered  Patient was seen and discussed with Pediatric Hematologist/Oncologist, Dr. Kamran Hill MD

## 2025-06-09 ENCOUNTER — HOSPITAL ENCOUNTER (OUTPATIENT)
Dept: PEDIATRIC HEMATOLOGY/ONCOLOGY | Facility: HOSPITAL | Age: 20
End: 2025-06-09
Payer: COMMERCIAL

## 2025-06-09 DIAGNOSIS — D61.9 APLASTIC ANEMIA: Primary | ICD-10-CM

## 2025-06-16 ENCOUNTER — HOSPITAL ENCOUNTER (OUTPATIENT)
Dept: PEDIATRIC HEMATOLOGY/ONCOLOGY | Facility: HOSPITAL | Age: 20
Discharge: HOME | End: 2025-06-16
Payer: COMMERCIAL

## 2025-06-16 ENCOUNTER — APPOINTMENT (OUTPATIENT)
Dept: PEDIATRIC HEMATOLOGY/ONCOLOGY | Facility: HOSPITAL | Age: 20
End: 2025-06-16
Payer: COMMERCIAL

## 2025-06-16 DIAGNOSIS — D61.9 APLASTIC ANEMIA: ICD-10-CM

## 2025-06-16 LAB
ALBUMIN SERPL BCP-MCNC: 4.6 G/DL (ref 3.4–5)
ALP SERPL-CCNC: 52 U/L (ref 33–120)
ALT SERPL W P-5'-P-CCNC: 11 U/L (ref 10–52)
ANION GAP SERPL CALC-SCNC: 11 MMOL/L (ref 10–20)
AST SERPL W P-5'-P-CCNC: 16 U/L (ref 9–39)
BASOPHILS # BLD AUTO: 0.01 X10*3/UL (ref 0–0.1)
BASOPHILS NFR BLD AUTO: 0.2 %
BILIRUB DIRECT SERPL-MCNC: 0.1 MG/DL (ref 0–0.3)
BILIRUB SERPL-MCNC: 0.8 MG/DL (ref 0–1.2)
BUN SERPL-MCNC: 10 MG/DL (ref 6–23)
CALCIUM SERPL-MCNC: 9.4 MG/DL (ref 8.6–10.6)
CHLORIDE SERPL-SCNC: 105 MMOL/L (ref 98–107)
CO2 SERPL-SCNC: 27 MMOL/L (ref 21–32)
CREAT SERPL-MCNC: 1.02 MG/DL (ref 0.5–1.3)
EGFRCR SERPLBLD CKD-EPI 2021: >90 ML/MIN/1.73M*2
EOSINOPHIL # BLD AUTO: 0.1 X10*3/UL (ref 0–0.7)
EOSINOPHIL NFR BLD AUTO: 2.1 %
ERYTHROCYTE [DISTWIDTH] IN BLOOD BY AUTOMATED COUNT: 12.8 % (ref 11.5–14.5)
GLUCOSE SERPL-MCNC: 92 MG/DL (ref 74–99)
HCT VFR BLD AUTO: 38 % (ref 41–52)
HGB BLD-MCNC: 12.6 G/DL (ref 13.5–17.5)
HGB RETIC QN: 34 PG (ref 28–38)
IMM GRANULOCYTES # BLD AUTO: 0.03 X10*3/UL (ref 0–0.7)
IMM GRANULOCYTES NFR BLD AUTO: 0.6 % (ref 0–0.9)
IMMATURE RETIC FRACTION: 15.1 %
LYMPHOCYTES # BLD AUTO: 1.8 X10*3/UL (ref 1.2–4.8)
LYMPHOCYTES NFR BLD AUTO: 38.2 %
MCH RBC QN AUTO: 29.1 PG (ref 26–34)
MCHC RBC AUTO-ENTMCNC: 33.2 G/DL (ref 32–36)
MCV RBC AUTO: 88 FL (ref 80–100)
MONOCYTES # BLD AUTO: 0.39 X10*3/UL (ref 0.1–1)
MONOCYTES NFR BLD AUTO: 8.3 %
NEUTROPHILS # BLD AUTO: 2.38 X10*3/UL (ref 1.2–7.7)
NEUTROPHILS NFR BLD AUTO: 50.6 %
NRBC BLD-RTO: 0 /100 WBCS (ref 0–0)
PHOSPHATE SERPL-MCNC: 3.8 MG/DL (ref 2.5–4.9)
PLATELET # BLD AUTO: 133 X10*3/UL (ref 150–450)
POTASSIUM SERPL-SCNC: 4.2 MMOL/L (ref 3.5–5.3)
PROT SERPL-MCNC: 7.5 G/DL (ref 6.4–8.2)
RBC # BLD AUTO: 4.33 X10*6/UL (ref 4.5–5.9)
RETICS #: 0.08 X10*6/UL (ref 0.02–0.12)
RETICS/RBC NFR AUTO: 1.8 % (ref 0.5–2)
SODIUM SERPL-SCNC: 139 MMOL/L (ref 136–145)
WBC # BLD AUTO: 4.7 X10*3/UL (ref 4.4–11.3)

## 2025-06-16 PROCEDURE — 80053 COMPREHEN METABOLIC PANEL: CPT | Performed by: STUDENT IN AN ORGANIZED HEALTH CARE EDUCATION/TRAINING PROGRAM

## 2025-06-16 PROCEDURE — 85045 AUTOMATED RETICULOCYTE COUNT: CPT | Performed by: STUDENT IN AN ORGANIZED HEALTH CARE EDUCATION/TRAINING PROGRAM

## 2025-06-16 PROCEDURE — 82248 BILIRUBIN DIRECT: CPT | Performed by: STUDENT IN AN ORGANIZED HEALTH CARE EDUCATION/TRAINING PROGRAM

## 2025-06-16 PROCEDURE — 36415 COLL VENOUS BLD VENIPUNCTURE: CPT | Performed by: STUDENT IN AN ORGANIZED HEALTH CARE EDUCATION/TRAINING PROGRAM

## 2025-06-16 PROCEDURE — 84100 ASSAY OF PHOSPHORUS: CPT | Performed by: STUDENT IN AN ORGANIZED HEALTH CARE EDUCATION/TRAINING PROGRAM

## 2025-06-16 PROCEDURE — 85025 COMPLETE CBC W/AUTO DIFF WBC: CPT | Performed by: STUDENT IN AN ORGANIZED HEALTH CARE EDUCATION/TRAINING PROGRAM

## 2025-06-23 ENCOUNTER — APPOINTMENT (OUTPATIENT)
Dept: PEDIATRIC HEMATOLOGY/ONCOLOGY | Facility: HOSPITAL | Age: 20
End: 2025-06-23
Payer: COMMERCIAL

## 2025-06-23 DIAGNOSIS — D61.9 APLASTIC ANEMIA: Primary | ICD-10-CM

## 2025-07-07 NOTE — PROGRESS NOTES
Patient ID: Joaquin Talbot is a 20 y.o. male.  Referring Physician: Judith Hill MD  No address on file  Primary Care Provider: No primary care provider on file.    Date of Service:  7/9/2025    SUBJECTIVE:    History of Present Illness:  Joaquin Talbot is a 20 year old male with a history of hypertension presenting for follow up for severe aplastic anemia treated with immunosuppression with adequate response. He was diagnosed and started on IST in 02/2022 and has been transfusion-independent since 04/2022. He began weaning CSA in 03/2024 and continues to wean CSA and Promacta. History is provided by the patient.    Joaquin reports he has done well since last visit. He reports he has been able to play sports and keep up with all the things he wants to do. He reports no dizziness, lightheadedness, palpitations, shortness of breath, exercise intolerance, or headaches. He reports some intermittent mild bleeding when he brushes his teeth, but no hematuria or blood in stool, no epistaxis. He reports minimal bruising, usually from playing basketball and explainable by injury or trauma. He reports no recent fevers, congestion, rhinorrhea, vomiting, or diarrhea.    Joaquin reports he continues to take 50mg of cyclosporine, 150 of Promacta and reports about 2 missed doses of each per month. He reports he ran out of fluconazole 2 days ago, but has otherwise been taking it regularly as well. He needs a refill for all medications today.      Past Medical History: Joaquin has a past medical history of Essential (primary) hypertension, Hemorrhage due to vascular prosthetic devices, implants and grafts, initial encounter (03/08/2022), and Strain of adductor muscle, fascia and tendon of unspecified thigh, initial encounter (09/15/2021).    Surgical History:  Joaquin has a past surgical history that includes Other surgical history (08/04/2022) and Other surgical history (08/04/2022).    Social History:  Joaquin  "is a student at [employer], lives there throughout the summer. Plays basketball.    Family History[1]    Review of Systems   Constitutional:  Negative for activity change, appetite change and fever.   HENT:  Negative for congestion and rhinorrhea.    Eyes:  Negative for discharge and redness.   Respiratory:  Negative for shortness of breath and wheezing.    Cardiovascular:  Negative for chest pain and palpitations.   Gastrointestinal:  Negative for abdominal pain, blood in stool, diarrhea and vomiting.   Genitourinary:  Negative for difficulty urinating and hematuria.   Musculoskeletal:  Negative for arthralgias and gait problem.   Skin:  Negative for rash and wound.   Allergic/Immunologic: Negative for environmental allergies and food allergies.   Hematological:  Negative for adenopathy. Does not bruise/bleed easily.       Home Medication Adherence:  Adherence with home medication regimen: Yes   Adherence comments: TWO missed doses of cyclosporine and Promacta per month  Adherence information obtained from: Patient    OBJECTIVE:    VS:  /73 (BP Location: Right arm, Patient Position: Sitting, BP Cuff Size: Large adult)   Pulse 77   Temp 36.3 °C (97.3 °F) (Oral)   Resp 20   Ht 1.808 m (5' 11.18\")   Wt 83.8 kg (184 lb 11.9 oz)   BMI 25.64 kg/m²   BSA: 2.05 meters squared    Physical Exam  Constitutional:       General: He is not in acute distress.     Appearance: He is not toxic-appearing.      Comments: Answers questions appropriately for age, normal affect   HENT:      Head: Normocephalic and atraumatic.      Nose: No congestion or rhinorrhea.      Mouth/Throat:      Mouth: Mucous membranes are moist.      Pharynx: Oropharynx is clear. No oropharyngeal exudate or posterior oropharyngeal erythema.      Comments: No oral lesions, no gingival hyperplasia  Eyes:      General:         Right eye: No discharge.         Left eye: No discharge.   Cardiovascular:      Rate and Rhythm: Normal rate and regular rhythm.    "   Pulses: Normal pulses.      Heart sounds: Normal heart sounds. No murmur heard.  Pulmonary:      Effort: Pulmonary effort is normal.      Breath sounds: Normal breath sounds. No wheezing, rhonchi or rales.   Abdominal:      General: Bowel sounds are normal. There is no distension.      Palpations: Abdomen is soft.      Tenderness: There is no abdominal tenderness.   Musculoskeletal:      Right lower leg: No edema.      Left lower leg: No edema.   Lymphadenopathy:      Cervical: No cervical adenopathy.   Skin:     General: Skin is warm and dry.      Capillary Refill: Capillary refill takes less than 2 seconds.      Findings: No bruising.      Comments: Small hyperpigmented lesion to left forearm without overlying wound, no visible bruising to trunk   Neurological:      General: No focal deficit present.      Mental Status: He is alert. Mental status is at baseline.       Laboratory:  Results for orders placed or performed during the hospital encounter of 07/09/25 (from the past 24 hours)   CBC and Auto Differential   Result Value Ref Range    WBC 4.9 4.4 - 11.3 x10*3/uL    nRBC 0.0 0.0 - 0.0 /100 WBCs    RBC 4.28 (L) 4.50 - 5.90 x10*6/uL    Hemoglobin 12.8 (L) 13.5 - 17.5 g/dL    Hematocrit 37.3 (L) 41.0 - 52.0 %    MCV 87 80 - 100 fL    MCH 29.9 26.0 - 34.0 pg    MCHC 34.3 32.0 - 36.0 g/dL    RDW 12.7 11.5 - 14.5 %    Platelets 129 (L) 150 - 450 x10*3/uL    Neutrophils % 52.9 40.0 - 80.0 %    Immature Granulocytes %, Automated 0.2 0.0 - 0.9 %    Lymphocytes % 35.1 13.0 - 44.0 %    Monocytes % 10.2 2.0 - 10.0 %    Eosinophils % 1.2 0.0 - 6.0 %    Basophils % 0.4 0.0 - 2.0 %    Neutrophils Absolute 2.59 1.20 - 7.70 x10*3/uL    Immature Granulocytes Absolute, Automated 0.01 0.00 - 0.70 x10*3/uL    Lymphocytes Absolute 1.72 1.20 - 4.80 x10*3/uL    Monocytes Absolute 0.50 0.10 - 1.00 x10*3/uL    Eosinophils Absolute 0.06 0.00 - 0.70 x10*3/uL    Basophils Absolute 0.02 0.00 - 0.10 x10*3/uL   Hepatic Function Panel    Result Value Ref Range    Albumin 4.6 3.4 - 5.0 g/dL    Bilirubin, Total 1.4 (H) 0.0 - 1.2 mg/dL    Bilirubin, Direct 0.2 0.0 - 0.3 mg/dL    Alkaline Phosphatase 51 33 - 120 U/L    ALT 12 10 - 52 U/L    AST 23 9 - 39 U/L    Total Protein 7.6 6.4 - 8.2 g/dL   Reticulocytes   Result Value Ref Range    Retic % 1.5 0.5 - 2.0 %    Retic Absolute 0.063 0.022 - 0.118 x10*6/uL    Reticulocyte Hemoglobin 35 28 - 38 pg    Immature Retic fraction 12.4 <=16.0 %   Basic Metabolic Panel   Result Value Ref Range    Glucose 87 74 - 99 mg/dL    Sodium 139 136 - 145 mmol/L    Potassium 4.2 3.5 - 5.3 mmol/L    Chloride 104 98 - 107 mmol/L    Bicarbonate 27 21 - 32 mmol/L    Anion Gap 12 10 - 20 mmol/L    Urea Nitrogen 11 6 - 23 mg/dL    Creatinine 0.91 0.50 - 1.30 mg/dL    eGFR >90 >60 mL/min/1.73m*2    Calcium 9.7 8.6 - 10.6 mg/dL   Phosphorus   Result Value Ref Range    Phosphorus 4.2 2.5 - 4.9 mg/dL   Cyclosporine   Result Value Ref Range    Cyclosporine <30 (L) 80 - 210 ng/mL        ASSESSMENT and PLAN:    Joaquin Talbot is a 20 year old male with a history of hypertension presenting for follow up for severe aplastic anemia treated with immunosuppression with adequate response. He was diagnosed and started on IST in 02/2022 and has been transfusion-independent since 04/2022. He began weaning CSA in 03/2024 and continues to wean CSA, continues to require Promacta due to thrombocytopenia when he recently discontinued it. 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 He has continued to have a small residual PNH clone without expansion or symptoms. Counts today are stable compared to the last few visits despite weaning immunosuppression; appropriate to continue wean today.     Aplastic Anemia  - Labs today:   - CBC   - RFP/HFP   - Retic   - CSA level  - WEAN cyclosporine to 25 mg daily today; new prescription sent   - CONTINUE Promacta 150 mg daily; new  prescription sent  - Monitor PNH clone annually unless signs/symptoms of hemolysis  - Continue to monitor counts closely for signs of relapse; may need evaluation for BMT in the future if recurrence of cytopenias off immunosuppression    Supportive Care  - PJP prophylaxis: Pentamidine  - Fungal prophylaxis: fluconazole  - Discussed limiting activities at times when platelets are low, avoiding activities with high risk of head injury/trauma. Continue to monitor for bleeding, call if concerns    Follow Up  - 4 weeks for pentamidine and labs  - Lab results and follow up plan discussed with patient by phone    Jennifer Bullock DO  Pediatric Hematology/Oncology Fellow (PGY6)        [1]   Family History  Problem Relation Name Age of Onset    Diabetes Father

## 2025-07-09 ENCOUNTER — HOSPITAL ENCOUNTER (OUTPATIENT)
Dept: PEDIATRIC HEMATOLOGY/ONCOLOGY | Facility: HOSPITAL | Age: 20
Discharge: HOME | End: 2025-07-09
Payer: COMMERCIAL

## 2025-07-09 VITALS
RESPIRATION RATE: 20 BRPM | DIASTOLIC BLOOD PRESSURE: 73 MMHG | SYSTOLIC BLOOD PRESSURE: 117 MMHG | HEART RATE: 77 BPM | HEIGHT: 71 IN | BODY MASS INDEX: 25.86 KG/M2 | TEMPERATURE: 97.3 F | WEIGHT: 184.75 LBS

## 2025-07-09 DIAGNOSIS — Z79.621: ICD-10-CM

## 2025-07-09 DIAGNOSIS — D61.9 APLASTIC ANEMIA: Primary | ICD-10-CM

## 2025-07-09 DIAGNOSIS — D84.9 IMMUNOCOMPROMISED: ICD-10-CM

## 2025-07-09 LAB
ALBUMIN SERPL BCP-MCNC: 4.6 G/DL (ref 3.4–5)
ALP SERPL-CCNC: 51 U/L (ref 33–120)
ALT SERPL W P-5'-P-CCNC: 12 U/L (ref 10–52)
ANION GAP SERPL CALC-SCNC: 12 MMOL/L (ref 10–20)
AST SERPL W P-5'-P-CCNC: 23 U/L (ref 9–39)
BASOPHILS # BLD AUTO: 0.02 X10*3/UL (ref 0–0.1)
BASOPHILS NFR BLD AUTO: 0.4 %
BILIRUB DIRECT SERPL-MCNC: 0.2 MG/DL (ref 0–0.3)
BILIRUB SERPL-MCNC: 1.4 MG/DL (ref 0–1.2)
BUN SERPL-MCNC: 11 MG/DL (ref 6–23)
CALCIUM SERPL-MCNC: 9.7 MG/DL (ref 8.6–10.6)
CHLORIDE SERPL-SCNC: 104 MMOL/L (ref 98–107)
CO2 SERPL-SCNC: 27 MMOL/L (ref 21–32)
CREAT SERPL-MCNC: 0.91 MG/DL (ref 0.5–1.3)
CYCLOSPORINE BLD IA-MCNC: <30 NG/ML (ref 80–210)
EGFRCR SERPLBLD CKD-EPI 2021: >90 ML/MIN/1.73M*2
EOSINOPHIL # BLD AUTO: 0.06 X10*3/UL (ref 0–0.7)
EOSINOPHIL NFR BLD AUTO: 1.2 %
ERYTHROCYTE [DISTWIDTH] IN BLOOD BY AUTOMATED COUNT: 12.7 % (ref 11.5–14.5)
GLUCOSE SERPL-MCNC: 87 MG/DL (ref 74–99)
HCT VFR BLD AUTO: 37.3 % (ref 41–52)
HGB BLD-MCNC: 12.8 G/DL (ref 13.5–17.5)
HGB RETIC QN: 35 PG (ref 28–38)
IMM GRANULOCYTES # BLD AUTO: 0.01 X10*3/UL (ref 0–0.7)
IMM GRANULOCYTES NFR BLD AUTO: 0.2 % (ref 0–0.9)
IMMATURE RETIC FRACTION: 12.4 %
LYMPHOCYTES # BLD AUTO: 1.72 X10*3/UL (ref 1.2–4.8)
LYMPHOCYTES NFR BLD AUTO: 35.1 %
MCH RBC QN AUTO: 29.9 PG (ref 26–34)
MCHC RBC AUTO-ENTMCNC: 34.3 G/DL (ref 32–36)
MCV RBC AUTO: 87 FL (ref 80–100)
MONOCYTES # BLD AUTO: 0.5 X10*3/UL (ref 0.1–1)
MONOCYTES NFR BLD AUTO: 10.2 %
NEUTROPHILS # BLD AUTO: 2.59 X10*3/UL (ref 1.2–7.7)
NEUTROPHILS NFR BLD AUTO: 52.9 %
NRBC BLD-RTO: 0 /100 WBCS (ref 0–0)
PHOSPHATE SERPL-MCNC: 4.2 MG/DL (ref 2.5–4.9)
PLATELET # BLD AUTO: 129 X10*3/UL (ref 150–450)
POTASSIUM SERPL-SCNC: 4.2 MMOL/L (ref 3.5–5.3)
PROT SERPL-MCNC: 7.6 G/DL (ref 6.4–8.2)
RBC # BLD AUTO: 4.28 X10*6/UL (ref 4.5–5.9)
RETICS #: 0.06 X10*6/UL (ref 0.02–0.12)
RETICS/RBC NFR AUTO: 1.5 % (ref 0.5–2)
SODIUM SERPL-SCNC: 139 MMOL/L (ref 136–145)
WBC # BLD AUTO: 4.9 X10*3/UL (ref 4.4–11.3)

## 2025-07-09 PROCEDURE — 36415 COLL VENOUS BLD VENIPUNCTURE: CPT | Performed by: STUDENT IN AN ORGANIZED HEALTH CARE EDUCATION/TRAINING PROGRAM

## 2025-07-09 PROCEDURE — 94642 AEROSOL INHALATION TREATMENT: CPT | Performed by: STUDENT IN AN ORGANIZED HEALTH CARE EDUCATION/TRAINING PROGRAM

## 2025-07-09 PROCEDURE — 84075 ASSAY ALKALINE PHOSPHATASE: CPT | Performed by: STUDENT IN AN ORGANIZED HEALTH CARE EDUCATION/TRAINING PROGRAM

## 2025-07-09 PROCEDURE — 82374 ASSAY BLOOD CARBON DIOXIDE: CPT | Performed by: STUDENT IN AN ORGANIZED HEALTH CARE EDUCATION/TRAINING PROGRAM

## 2025-07-09 PROCEDURE — 2500000002 HC RX 250 W HCPCS SELF ADMINISTERED DRUGS (ALT 637 FOR MEDICARE OP, ALT 636 FOR OP/ED)

## 2025-07-09 PROCEDURE — 2500000004 HC RX 250 GENERAL PHARMACY W/ HCPCS (ALT 636 FOR OP/ED): Performed by: STUDENT IN AN ORGANIZED HEALTH CARE EDUCATION/TRAINING PROGRAM

## 2025-07-09 PROCEDURE — 84100 ASSAY OF PHOSPHORUS: CPT | Performed by: STUDENT IN AN ORGANIZED HEALTH CARE EDUCATION/TRAINING PROGRAM

## 2025-07-09 PROCEDURE — 80158 DRUG ASSAY CYCLOSPORINE: CPT | Performed by: STUDENT IN AN ORGANIZED HEALTH CARE EDUCATION/TRAINING PROGRAM

## 2025-07-09 PROCEDURE — 85025 COMPLETE CBC W/AUTO DIFF WBC: CPT | Performed by: STUDENT IN AN ORGANIZED HEALTH CARE EDUCATION/TRAINING PROGRAM

## 2025-07-09 PROCEDURE — 85045 AUTOMATED RETICULOCYTE COUNT: CPT | Performed by: STUDENT IN AN ORGANIZED HEALTH CARE EDUCATION/TRAINING PROGRAM

## 2025-07-09 RX ORDER — CYCLOSPORINE 25 MG/1
25 CAPSULE, LIQUID FILLED ORAL DAILY
Qty: 60 CAPSULE | Refills: 0 | Status: SHIPPED | OUTPATIENT
Start: 2025-07-09

## 2025-07-09 RX ORDER — FLUCONAZOLE 200 MG/1
400 TABLET ORAL DAILY
Qty: 60 TABLET | Refills: 2 | Status: SHIPPED | OUTPATIENT
Start: 2025-07-09 | End: 2025-10-07

## 2025-07-09 RX ORDER — ALBUTEROL SULFATE 0.83 MG/ML
5 SOLUTION RESPIRATORY (INHALATION) ONCE
Status: COMPLETED | OUTPATIENT
Start: 2025-07-09 | End: 2025-07-09

## 2025-07-09 RX ORDER — ALBUTEROL SULFATE 0.83 MG/ML
5 SOLUTION RESPIRATORY (INHALATION) ONCE
OUTPATIENT
Start: 2025-07-14 | End: 2025-07-14

## 2025-07-09 RX ORDER — ALBUTEROL SULFATE 0.83 MG/ML
2.5 SOLUTION RESPIRATORY (INHALATION) ONCE AS NEEDED
OUTPATIENT
Start: 2025-07-14

## 2025-07-09 RX ORDER — ELTROMBOPAG OLAMINE 75 MG/1
150 TABLET, FILM COATED ORAL
Qty: 60 TABLET | Refills: 3 | Status: SHIPPED | OUTPATIENT
Start: 2025-07-09

## 2025-07-09 RX ORDER — EPINEPHRINE 1 MG/ML
0.01 INJECTION, SOLUTION, CONCENTRATE INTRAVENOUS ONCE AS NEEDED
OUTPATIENT
Start: 2025-07-14

## 2025-07-09 RX ORDER — PENTAMIDINE ISETHIONATE 300 MG/300MG
300 INHALANT RESPIRATORY (INHALATION) ONCE
Status: COMPLETED | OUTPATIENT
Start: 2025-07-09 | End: 2025-07-09

## 2025-07-09 RX ORDER — DIPHENHYDRAMINE HYDROCHLORIDE 50 MG/ML
50 INJECTION, SOLUTION INTRAMUSCULAR; INTRAVENOUS ONCE AS NEEDED
OUTPATIENT
Start: 2025-07-14

## 2025-07-09 RX ORDER — ALBUTEROL SULFATE 0.83 MG/ML
SOLUTION RESPIRATORY (INHALATION)
Status: COMPLETED
Start: 2025-07-09 | End: 2025-07-09

## 2025-07-09 RX ORDER — PENTAMIDINE ISETHIONATE 300 MG/300MG
300 INHALANT RESPIRATORY (INHALATION) ONCE
OUTPATIENT
Start: 2025-07-14 | End: 2025-07-14

## 2025-07-09 RX ADMIN — ALBUTEROL SULFATE 5 MG: 2.5 SOLUTION RESPIRATORY (INHALATION) at 12:50

## 2025-07-09 RX ADMIN — PENTAMIDINE ISETHIONATE 300 MG: 300 INHALANT RESPIRATORY (INHALATION) at 13:14

## 2025-07-09 RX ADMIN — ALBUTEROL SULFATE 5 MG: 0.83 SOLUTION RESPIRATORY (INHALATION) at 12:50

## 2025-07-09 ASSESSMENT — ENCOUNTER SYMPTOMS
DIARRHEA: 0
BLOOD IN STOOL: 0
EYE DISCHARGE: 0
DEPRESSION: 0
ABDOMINAL PAIN: 0
WOUND: 0
ACTIVITY CHANGE: 0
LOSS OF SENSATION IN FEET: 0
FEVER: 0
EYE REDNESS: 0
HEMATURIA: 0
PALPITATIONS: 0
ARTHRALGIAS: 0
RHINORRHEA: 0
WHEEZING: 0
APPETITE CHANGE: 0
SHORTNESS OF BREATH: 0
DIFFICULTY URINATING: 0
BRUISES/BLEEDS EASILY: 0
ADENOPATHY: 0
VOMITING: 0
OCCASIONAL FEELINGS OF UNSTEADINESS: 0

## 2025-07-09 ASSESSMENT — PAIN SCALES - GENERAL: PAINLEVEL_OUTOF10: 0-NO PAIN

## 2025-07-09 NOTE — LETTER
July 9, 2025     Patient: Joaquin Talbot   YOB: 2005   Date of Visit: 7/9/2025       To Whom It May Concern:    Joaquin Talbot was seen in my clinic on 7/9/2025 at 11:45 am. Please excuse Joaquin for his tardiness from work on this day to make the appointment.    If you have any questions or concerns, please don't hesitate to call.         Sincerely,         Julio Villeda MD

## 2025-07-09 NOTE — PATIENT INSTRUCTIONS
PLEASE CALL your medical team at [phone] for any questions, concerns &/or the following reasons:  -Fever: temperature  greater than 100.4 F  -Low grade temperature less than 100.4F that occurs 2 times within a 12 hour period  -Shaking chills or shivering with or without fever.  -Uncontrolled nausea or vomiting.  -No bowel movement/stool in two days or for frequent episodes of diarrhea.  -Uncontrolled bleeding or bruising.    ADDITIONAL INSTRUCTIONS:  -Follow the treatment calendar provided for you.  -Take all medications as prescribed.  -DO NOT take or give tylenol or ibuprofen without contacting your medical team first.    In order to provide safe and effective care to you and all of our patients, we are asking that if you or your child is experiencing any of the symptoms below that you please call our triage nurse [phone] prior to your arrival.    These symptoms include but are not limited to:   Fevers within the last 24 hours   Uncontrolled pain   Vomiting or diarrhea   Coughing or runny nose   Bleeding actively and lasting longer than 15 minutes (including nose bleeds, gum bleeding, etc.)   Dizziness and/or weakness   Any rash   Changes in mental status    MyChart:  Please send non-urgent messages only.  Messages are checked during regular business hours, Monday - Friday 8:00-4:30pm.  *It may take up to 2 business days for our team to reply.

## 2025-08-04 ASSESSMENT — ENCOUNTER SYMPTOMS
WOUND: 0
PALPITATIONS: 0
EYE DISCHARGE: 0
FEVER: 0
HEMATURIA: 0
VOMITING: 0
DIARRHEA: 0
EYE REDNESS: 0
ADENOPATHY: 0
BRUISES/BLEEDS EASILY: 0
BLOOD IN STOOL: 0
APPETITE CHANGE: 0
WHEEZING: 0
ARTHRALGIAS: 0
ABDOMINAL PAIN: 0
SHORTNESS OF BREATH: 0
RHINORRHEA: 0
ACTIVITY CHANGE: 0
DIFFICULTY URINATING: 0

## 2025-08-04 NOTE — PROGRESS NOTES
Patient ID: Joaquin Talbot is a 20 y.o. male.  Referring Physician: Julio Villeda MD  [id number]  Department of Pediatrics-Hematology and [place] [address]  Primary Care Provider: No primary care provider on file.    Date of Service:  8/6/2025    SUBJECTIVE:    History of Present Illness:  Joaquin Talbot is a 20 year old male with a history of hypertension presenting for follow up for severe aplastic anemia treated with immunosuppression with adequate response. He was diagnosed and started on IST in 02/2022 and has been transfusion-independent since 04/2022. He began weaning CSA in 03/2024 and continues to wean CSA and Promacta. History is provided by the patient.    Joaquin reports he has done well since last visit. 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 He reports no blood in his stool or urine, no nose bleeds, and no consistent gum bleeding. He denies spontaneous or unexplained bruising or petechiae. His energy levels have been good and he has been able to work and play sports with friends without issues. He denies any recent fevers, aches, congestion, cough, or other sick symptoms. 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    Joaquin reports he has been taking 25 mg of CSA and 150 mg of Promacta daily without issues. He requests to switch from Pentamidine back to Bactrim today. Still taking fluconazole. He estimates ZERO missed doses of medications in the last month. He is anxious about stopping immunosuppression, but has no other questions or concerns today.      Past Medical History: Joaquin has a past medical history of Essential (primary) hypertension, Hemorrhage due to vascular prosthetic devices, implants and grafts, initial encounter (03/08/2022), and Strain of adductor muscle, fascia and tendon of unspecified thigh, initial encounter (09/15/2021).    Surgical History:  Joaquin has a past surgical history  "that includes Other surgical history (08/04/2022) and Other surgical history (08/04/2022).    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 Plays basketball.    Family History[1]    Review of Systems   Constitutional:  Negative for activity change, appetite change and fever.   HENT:  Negative for congestion and rhinorrhea.    Eyes:  Negative for discharge and redness.   Respiratory:  Negative for shortness of breath and wheezing.    Cardiovascular:  Negative for chest pain and palpitations.   Gastrointestinal:  Negative for abdominal pain, blood in stool, diarrhea and vomiting.   Genitourinary:  Negative for difficulty urinating and hematuria.   Musculoskeletal:  Negative for arthralgias and gait problem.   Skin:  Negative for rash and wound.   Allergic/Immunologic: Negative for environmental allergies and food allergies.   Hematological:  Negative for adenopathy. Does not bruise/bleed easily.     Home Medication Adherence:  Adherence with home medication regimen: Yes   Adherence comments: ZERO missed doses of cyclosporine and Promacta per month  Adherence information obtained from: Patient    OBJECTIVE:    VS:  /72 (BP Location: Left arm, Patient Position: Sitting, BP Cuff Size: Large adult)   Pulse 66   Temp 36.1 °C (97 °F) (Tympanic)   Resp 20   Ht 1.811 m (5' 11.3\")   Wt 82 kg (180 lb 12.4 oz)   BMI 25.00 kg/m²   BSA: 2.03 meters squared    Physical Exam  Constitutional:       General: He is not in acute distress.     Appearance: He is not toxic-appearing.      Comments: Answers questions appropriately for age, normal affect   HENT:      Head: Normocephalic and atraumatic.      Nose: No congestion or rhinorrhea.      Mouth/Throat:      Mouth: Mucous membranes are moist.      Pharynx: Oropharynx is clear. No oropharyngeal exudate or posterior oropharyngeal erythema.      Comments: No oral lesions, no gingival hyperplasia    Eyes:      General:         Right eye: " No discharge.         Left eye: No discharge.       Cardiovascular:      Rate and Rhythm: Normal rate and regular rhythm.      Pulses: Normal pulses.      Heart sounds: Normal heart sounds. No murmur heard.  Pulmonary:      Effort: Pulmonary effort is normal.      Breath sounds: Normal breath sounds. No wheezing, rhonchi or rales.   Abdominal:      General: Bowel sounds are normal. There is no distension.      Palpations: Abdomen is soft.      Tenderness: There is no abdominal tenderness.     Musculoskeletal:      Right lower leg: No edema.      Left lower leg: No edema.   Lymphadenopathy:      Cervical: No cervical adenopathy.     Skin:     General: Skin is warm and dry.      Capillary Refill: Capillary refill takes less than 2 seconds.      Findings: No bruising.     Neurological:      General: No focal deficit present.      Mental Status: He is alert. Mental status is at baseline.       Laboratory:  Results for orders placed or performed during the hospital encounter of 08/06/25 (from the past 24 hours)   CBC and Auto Differential   Result Value Ref Range    WBC 6.4 4.4 - 11.3 x10*3/uL    nRBC 0.0 0.0 - 0.0 /100 WBCs    RBC 4.15 (L) 4.50 - 5.90 x10*6/uL    Hemoglobin 12.3 (L) 13.5 - 17.5 g/dL    Hematocrit 36.8 (L) 41.0 - 52.0 %    MCV 89 80 - 100 fL    MCH 29.6 26.0 - 34.0 pg    MCHC 33.4 32.0 - 36.0 g/dL    RDW 11.9 11.5 - 14.5 %    Platelets 137 (L) 150 - 450 x10*3/uL    Neutrophils % 57.4 40.0 - 80.0 %    Immature Granulocytes %, Automated 0.5 0.0 - 0.9 %    Lymphocytes % 31.5 13.0 - 44.0 %    Monocytes % 9.0 2.0 - 10.0 %    Eosinophils % 1.4 0.0 - 6.0 %    Basophils % 0.2 0.0 - 2.0 %    Neutrophils Absolute 3.70 1.20 - 7.70 x10*3/uL    Immature Granulocytes Absolute, Automated 0.03 0.00 - 0.70 x10*3/uL    Lymphocytes Absolute 2.03 1.20 - 4.80 x10*3/uL    Monocytes Absolute 0.58 0.10 - 1.00 x10*3/uL    Eosinophils Absolute 0.09 0.00 - 0.70 x10*3/uL    Basophils Absolute 0.01 0.00 - 0.10 x10*3/uL   Hepatic  Function Panel   Result Value Ref Range    Albumin 4.5 3.4 - 5.0 g/dL    Bilirubin, Total 1.1 0.0 - 1.2 mg/dL    Bilirubin, Direct 0.1 0.0 - 0.3 mg/dL    Alkaline Phosphatase 50 33 - 120 U/L    ALT 10 10 - 52 U/L    AST 16 9 - 39 U/L    Total Protein 7.2 6.4 - 8.2 g/dL   Reticulocytes   Result Value Ref Range    Retic % 1.3 0.5 - 2.0 %    Retic Absolute 0.053 0.022 - 0.118 x10*6/uL    Reticulocyte Hemoglobin 33 28 - 38 pg    Immature Retic fraction 18.0 (H) <=16.0 %   Cyclosporine   Result Value Ref Range    Cyclosporine <30 (L) 80 - 210 ng/mL   Basic Metabolic Panel   Result Value Ref Range    Glucose 90 74 - 99 mg/dL    Sodium 140 136 - 145 mmol/L    Potassium 4.1 3.5 - 5.3 mmol/L    Chloride 106 98 - 107 mmol/L    Bicarbonate 28 21 - 32 mmol/L    Anion Gap 10 10 - 20 mmol/L    Urea Nitrogen 12 6 - 23 mg/dL    Creatinine 0.85 0.50 - 1.30 mg/dL    eGFR >90 >60 mL/min/1.73m*2    Calcium 9.3 8.6 - 10.6 mg/dL   Phosphorus   Result Value Ref Range    Phosphorus 4.2 2.5 - 4.9 mg/dL       ASSESSMENT and PLAN:    Joaquin Talbot is a 20 year old male with a history of hypertension presenting for follow up for severe aplastic anemia treated with immunosuppression with adequate response. He was diagnosed and started on IST in 02/2022 and has been transfusion-independent since 04/2022. He began weaning CSA in 03/2024 and continues to wean CSA, continues to require Promacta due to thrombocytopenia. 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 He has continued to have a small residual PNH clone without expansion or symptoms. Counts today are stable compared to the last few visits despite weaning immunosuppression.    Aplastic Anemia  - Labs today:   - CBC   - RFP/HFP   - Retic   - CSA level  - DISCONTINUE cyclosporine  - CONTINUE Promacta 150 mg daily  - Monitor PNH clone annually unless signs/symptoms of hemolysis  - Continue  to monitor counts closely for signs of relapse; may need evaluation for BMT in the future if recurrence of cytopenias off immunosuppression    Supportive Care  - PJP prophylaxis: START Bactrim 160mg of TMP Sat/Sun  - Fungal prophylaxis: fluconazole  - Discussed limiting activities at times when platelets are low, avoiding activities with high risk of head injury/trauma. Continue to monitor for bleeding, call if concerns    Follow Up  - 4 weeks for follow up with labs  - Left voicemail and [employer] message with results and plan, asked patient to call if questions/concerns    Jennifer Bullock DO  Pediatric Hematology/Oncology Fellow (PGY6)        [1]   Family History  Problem Relation Name Age of Onset    Diabetes Father      Autoimmune disease Neg Hx

## 2025-08-06 ENCOUNTER — HOSPITAL ENCOUNTER (OUTPATIENT)
Dept: PEDIATRIC HEMATOLOGY/ONCOLOGY | Facility: HOSPITAL | Age: 20
Discharge: HOME | End: 2025-08-06
Payer: COMMERCIAL

## 2025-08-06 VITALS
SYSTOLIC BLOOD PRESSURE: 115 MMHG | TEMPERATURE: 97 F | HEIGHT: 71 IN | RESPIRATION RATE: 20 BRPM | WEIGHT: 180.78 LBS | DIASTOLIC BLOOD PRESSURE: 72 MMHG | HEART RATE: 66 BPM | BODY MASS INDEX: 25.31 KG/M2

## 2025-08-06 DIAGNOSIS — D84.9 IMMUNOCOMPROMISED: ICD-10-CM

## 2025-08-06 DIAGNOSIS — D61.9 APLASTIC ANEMIA: Primary | ICD-10-CM

## 2025-08-06 DIAGNOSIS — Z79.621: ICD-10-CM

## 2025-08-06 LAB
ALBUMIN SERPL BCP-MCNC: 4.5 G/DL (ref 3.4–5)
ALP SERPL-CCNC: 50 U/L (ref 33–120)
ALT SERPL W P-5'-P-CCNC: 10 U/L (ref 10–52)
ANION GAP SERPL CALC-SCNC: 10 MMOL/L (ref 10–20)
AST SERPL W P-5'-P-CCNC: 16 U/L (ref 9–39)
BASOPHILS # BLD AUTO: 0.01 X10*3/UL (ref 0–0.1)
BASOPHILS NFR BLD AUTO: 0.2 %
BILIRUB DIRECT SERPL-MCNC: 0.1 MG/DL (ref 0–0.3)
BILIRUB SERPL-MCNC: 1.1 MG/DL (ref 0–1.2)
BUN SERPL-MCNC: 12 MG/DL (ref 6–23)
CALCIUM SERPL-MCNC: 9.3 MG/DL (ref 8.6–10.6)
CHLORIDE SERPL-SCNC: 106 MMOL/L (ref 98–107)
CO2 SERPL-SCNC: 28 MMOL/L (ref 21–32)
CREAT SERPL-MCNC: 0.85 MG/DL (ref 0.5–1.3)
CYCLOSPORINE BLD IA-MCNC: <30 NG/ML (ref 80–210)
EGFRCR SERPLBLD CKD-EPI 2021: >90 ML/MIN/1.73M*2
EOSINOPHIL # BLD AUTO: 0.09 X10*3/UL (ref 0–0.7)
EOSINOPHIL NFR BLD AUTO: 1.4 %
ERYTHROCYTE [DISTWIDTH] IN BLOOD BY AUTOMATED COUNT: 11.9 % (ref 11.5–14.5)
GLUCOSE SERPL-MCNC: 90 MG/DL (ref 74–99)
HCT VFR BLD AUTO: 36.8 % (ref 41–52)
HGB BLD-MCNC: 12.3 G/DL (ref 13.5–17.5)
HGB RETIC QN: 33 PG (ref 28–38)
IMM GRANULOCYTES # BLD AUTO: 0.03 X10*3/UL (ref 0–0.7)
IMM GRANULOCYTES NFR BLD AUTO: 0.5 % (ref 0–0.9)
IMMATURE RETIC FRACTION: 18 %
LYMPHOCYTES # BLD AUTO: 2.03 X10*3/UL (ref 1.2–4.8)
LYMPHOCYTES NFR BLD AUTO: 31.5 %
MCH RBC QN AUTO: 29.6 PG (ref 26–34)
MCHC RBC AUTO-ENTMCNC: 33.4 G/DL (ref 32–36)
MCV RBC AUTO: 89 FL (ref 80–100)
MONOCYTES # BLD AUTO: 0.58 X10*3/UL (ref 0.1–1)
MONOCYTES NFR BLD AUTO: 9 %
NEUTROPHILS # BLD AUTO: 3.7 X10*3/UL (ref 1.2–7.7)
NEUTROPHILS NFR BLD AUTO: 57.4 %
NRBC BLD-RTO: 0 /100 WBCS (ref 0–0)
PHOSPHATE SERPL-MCNC: 4.2 MG/DL (ref 2.5–4.9)
PLATELET # BLD AUTO: 137 X10*3/UL (ref 150–450)
POTASSIUM SERPL-SCNC: 4.1 MMOL/L (ref 3.5–5.3)
PROT SERPL-MCNC: 7.2 G/DL (ref 6.4–8.2)
RBC # BLD AUTO: 4.15 X10*6/UL (ref 4.5–5.9)
RETICS #: 0.05 X10*6/UL (ref 0.02–0.12)
RETICS/RBC NFR AUTO: 1.3 % (ref 0.5–2)
SODIUM SERPL-SCNC: 140 MMOL/L (ref 136–145)
WBC # BLD AUTO: 6.4 X10*3/UL (ref 4.4–11.3)

## 2025-08-06 PROCEDURE — 80053 COMPREHEN METABOLIC PANEL: CPT | Performed by: STUDENT IN AN ORGANIZED HEALTH CARE EDUCATION/TRAINING PROGRAM

## 2025-08-06 PROCEDURE — 84100 ASSAY OF PHOSPHORUS: CPT | Performed by: STUDENT IN AN ORGANIZED HEALTH CARE EDUCATION/TRAINING PROGRAM

## 2025-08-06 PROCEDURE — 85025 COMPLETE CBC W/AUTO DIFF WBC: CPT | Performed by: STUDENT IN AN ORGANIZED HEALTH CARE EDUCATION/TRAINING PROGRAM

## 2025-08-06 PROCEDURE — 85045 AUTOMATED RETICULOCYTE COUNT: CPT | Performed by: STUDENT IN AN ORGANIZED HEALTH CARE EDUCATION/TRAINING PROGRAM

## 2025-08-06 PROCEDURE — 82248 BILIRUBIN DIRECT: CPT | Performed by: STUDENT IN AN ORGANIZED HEALTH CARE EDUCATION/TRAINING PROGRAM

## 2025-08-06 PROCEDURE — 36415 COLL VENOUS BLD VENIPUNCTURE: CPT | Performed by: STUDENT IN AN ORGANIZED HEALTH CARE EDUCATION/TRAINING PROGRAM

## 2025-08-06 PROCEDURE — 80158 DRUG ASSAY CYCLOSPORINE: CPT | Performed by: STUDENT IN AN ORGANIZED HEALTH CARE EDUCATION/TRAINING PROGRAM

## 2025-08-06 RX ORDER — PENTAMIDINE ISETHIONATE 300 MG/300MG
300 INHALANT RESPIRATORY (INHALATION) ONCE
Status: DISCONTINUED | OUTPATIENT
Start: 2025-08-06 | End: 2025-08-07 | Stop reason: HOSPADM

## 2025-08-06 RX ORDER — SULFAMETHOXAZOLE AND TRIMETHOPRIM 800; 160 MG/1; MG/1
1 TABLET ORAL 2 TIMES DAILY
Qty: 30 TABLET | Refills: 1 | Status: SHIPPED | OUTPATIENT
Start: 2025-08-06

## 2025-08-06 RX ORDER — ALBUTEROL SULFATE 0.83 MG/ML
2.5 SOLUTION RESPIRATORY (INHALATION) ONCE AS NEEDED
OUTPATIENT
Start: 2025-09-03

## 2025-08-06 RX ORDER — ALBUTEROL SULFATE 0.83 MG/ML
5 SOLUTION RESPIRATORY (INHALATION) ONCE
Status: DISCONTINUED | OUTPATIENT
Start: 2025-08-06 | End: 2025-08-07 | Stop reason: HOSPADM

## 2025-08-06 RX ORDER — ALBUTEROL SULFATE 0.83 MG/ML
5 SOLUTION RESPIRATORY (INHALATION) ONCE
OUTPATIENT
Start: 2025-09-03 | End: 2025-09-03

## 2025-08-06 RX ORDER — DIPHENHYDRAMINE HYDROCHLORIDE 50 MG/ML
50 INJECTION, SOLUTION INTRAMUSCULAR; INTRAVENOUS ONCE AS NEEDED
OUTPATIENT
Start: 2025-09-03

## 2025-08-06 RX ORDER — EPINEPHRINE 1 MG/ML
0.01 INJECTION, SOLUTION, CONCENTRATE INTRAVENOUS ONCE AS NEEDED
OUTPATIENT
Start: 2025-09-03

## 2025-08-06 RX ORDER — PENTAMIDINE ISETHIONATE 300 MG/300MG
300 INHALANT RESPIRATORY (INHALATION) ONCE
OUTPATIENT
Start: 2025-09-03 | End: 2025-09-03

## 2025-08-06 RX ORDER — SULFAMETHOXAZOLE AND TRIMETHOPRIM 800; 160 MG/1; MG/1
1 TABLET ORAL 2 TIMES DAILY
Qty: 30 TABLET | Refills: 1 | Status: SHIPPED | OUTPATIENT
Start: 2025-08-06 | End: 2025-08-06

## 2025-08-06 ASSESSMENT — ENCOUNTER SYMPTOMS
DEPRESSION: 0
LOSS OF SENSATION IN FEET: 0
OCCASIONAL FEELINGS OF UNSTEADINESS: 0

## 2025-08-06 ASSESSMENT — COLUMBIA-SUICIDE SEVERITY RATING SCALE - C-SSRS
1. IN THE PAST MONTH, HAVE YOU WISHED YOU WERE DEAD OR WISHED YOU COULD GO TO SLEEP AND NOT WAKE UP?: NO
2. HAVE YOU ACTUALLY HAD ANY THOUGHTS OF KILLING YOURSELF?: NO
6. HAVE YOU EVER DONE ANYTHING, STARTED TO DO ANYTHING, OR PREPARED TO DO ANYTHING TO END YOUR LIFE?: NO

## 2025-08-06 ASSESSMENT — PAIN SCALES - GENERAL: PAINLEVEL_OUTOF10: 0-NO PAIN

## 2025-09-02 ASSESSMENT — ENCOUNTER SYMPTOMS
ABDOMINAL PAIN: 0
FEVER: 0
DIFFICULTY URINATING: 0
BRUISES/BLEEDS EASILY: 0
APPETITE CHANGE: 0
ACTIVITY CHANGE: 0
RHINORRHEA: 0
DIARRHEA: 0
BLOOD IN STOOL: 0
ARTHRALGIAS: 0
ADENOPATHY: 0
VOMITING: 0
EYE DISCHARGE: 0
EYE REDNESS: 0
WOUND: 0
SHORTNESS OF BREATH: 0
HEMATURIA: 0
WHEEZING: 0
PALPITATIONS: 0

## 2025-09-03 ENCOUNTER — APPOINTMENT (OUTPATIENT)
Dept: PEDIATRIC HEMATOLOGY/ONCOLOGY | Facility: HOSPITAL | Age: 20
End: 2025-09-03
Payer: COMMERCIAL

## 2025-09-03 DIAGNOSIS — D61.9 APLASTIC ANEMIA: Primary | ICD-10-CM
